# Patient Record
Sex: FEMALE | Race: WHITE | NOT HISPANIC OR LATINO | Employment: OTHER | URBAN - METROPOLITAN AREA
[De-identification: names, ages, dates, MRNs, and addresses within clinical notes are randomized per-mention and may not be internally consistent; named-entity substitution may affect disease eponyms.]

---

## 2017-01-23 RX ORDER — LEVOTHYROXINE SODIUM 25 UG/1
TABLET ORAL
COMMUNITY
Start: 2015-08-14 | End: 2017-08-08

## 2017-01-23 RX ORDER — ALPRAZOLAM 0.25 MG/1
0.25 TABLET ORAL
COMMUNITY

## 2017-01-23 RX ORDER — OMEPRAZOLE 40 MG/1
40 CAPSULE, DELAYED RELEASE ORAL DAILY
COMMUNITY
Start: 2016-12-08

## 2017-01-23 RX ORDER — METFORMIN HYDROCHLORIDE 500 MG/1
500 TABLET ORAL 2 TIMES DAILY WITH MEALS
COMMUNITY
Start: 2014-11-10

## 2017-01-23 RX ORDER — HYDROCHLOROTHIAZIDE 12.5 MG/1
12.5 TABLET ORAL
COMMUNITY
Start: 2014-12-04 | End: 2018-06-28

## 2017-01-23 RX ORDER — ASPIRIN 81 MG/1
81 TABLET ORAL DAILY
COMMUNITY

## 2017-01-23 RX ORDER — VIT C/E/ZN/COPPR/LUTEIN/ZEAXAN 250MG-90MG
1000 CAPSULE ORAL
COMMUNITY

## 2017-01-23 RX ORDER — PRAVASTATIN SODIUM 40 MG/1
40 TABLET ORAL
COMMUNITY
Start: 2014-04-21 | End: 2018-06-28

## 2017-01-23 RX ORDER — AZELASTINE 1 MG/ML
1 SPRAY, METERED NASAL
COMMUNITY
Start: 2016-12-08

## 2017-01-23 RX ORDER — METOPROLOL SUCCINATE 100 MG/1
100 TABLET, EXTENDED RELEASE ORAL
COMMUNITY
Start: 2014-12-04 | End: 2018-06-28

## 2017-01-24 ENCOUNTER — OFFICE VISIT (OUTPATIENT)
Dept: VASCULAR SURGERY | Facility: CLINIC | Age: 67
End: 2017-01-24
Payer: MEDICARE

## 2017-01-24 VITALS
BODY MASS INDEX: 25.06 KG/M2 | HEART RATE: 68 BPM | WEIGHT: 127.63 LBS | DIASTOLIC BLOOD PRESSURE: 75 MMHG | SYSTOLIC BLOOD PRESSURE: 150 MMHG | HEIGHT: 60 IN

## 2017-01-24 DIAGNOSIS — I65.23 BILATERAL CAROTID ARTERY STENOSIS: ICD-10-CM

## 2017-01-24 DIAGNOSIS — I10 ESSENTIAL HYPERTENSION: ICD-10-CM

## 2017-01-24 DIAGNOSIS — I25.10 CORONARY ARTERY DISEASE INVOLVING NATIVE CORONARY ARTERY OF NATIVE HEART WITHOUT ANGINA PECTORIS: ICD-10-CM

## 2017-01-24 DIAGNOSIS — Z72.0 TOBACCO ABUSE: ICD-10-CM

## 2017-01-24 DIAGNOSIS — E11.9 CONTROLLED TYPE 2 DIABETES MELLITUS WITHOUT COMPLICATION, WITHOUT LONG-TERM CURRENT USE OF INSULIN: ICD-10-CM

## 2017-01-24 PROCEDURE — 99406 BEHAV CHNG SMOKING 3-10 MIN: CPT | Mod: S$GLB,,, | Performed by: THORACIC SURGERY (CARDIOTHORACIC VASCULAR SURGERY)

## 2017-01-24 PROCEDURE — 1159F MED LIST DOCD IN RCRD: CPT | Mod: S$GLB,,, | Performed by: THORACIC SURGERY (CARDIOTHORACIC VASCULAR SURGERY)

## 2017-01-24 PROCEDURE — 3078F DIAST BP <80 MM HG: CPT | Mod: S$GLB,,, | Performed by: THORACIC SURGERY (CARDIOTHORACIC VASCULAR SURGERY)

## 2017-01-24 PROCEDURE — 99205 OFFICE O/P NEW HI 60 MIN: CPT | Mod: 25,S$GLB,, | Performed by: THORACIC SURGERY (CARDIOTHORACIC VASCULAR SURGERY)

## 2017-01-24 PROCEDURE — 3077F SYST BP >= 140 MM HG: CPT | Mod: S$GLB,,, | Performed by: THORACIC SURGERY (CARDIOTHORACIC VASCULAR SURGERY)

## 2017-01-24 PROCEDURE — 1160F RVW MEDS BY RX/DR IN RCRD: CPT | Mod: S$GLB,,, | Performed by: THORACIC SURGERY (CARDIOTHORACIC VASCULAR SURGERY)

## 2017-01-24 PROCEDURE — 3046F HEMOGLOBIN A1C LEVEL >9.0%: CPT | Mod: S$GLB,,, | Performed by: THORACIC SURGERY (CARDIOTHORACIC VASCULAR SURGERY)

## 2017-01-24 PROCEDURE — 2022F DILAT RTA XM EVC RTNOPTHY: CPT | Mod: S$GLB,,, | Performed by: THORACIC SURGERY (CARDIOTHORACIC VASCULAR SURGERY)

## 2017-01-24 PROCEDURE — 3060F POS MICROALBUMINURIA REV: CPT | Mod: S$GLB,,, | Performed by: THORACIC SURGERY (CARDIOTHORACIC VASCULAR SURGERY)

## 2017-01-24 PROCEDURE — 1126F AMNT PAIN NOTED NONE PRSNT: CPT | Mod: S$GLB,,, | Performed by: THORACIC SURGERY (CARDIOTHORACIC VASCULAR SURGERY)

## 2017-01-24 PROCEDURE — 99999 PR PBB SHADOW E&M-EST. PATIENT-LVL III: CPT | Mod: PBBFAC,,, | Performed by: THORACIC SURGERY (CARDIOTHORACIC VASCULAR SURGERY)

## 2017-01-24 PROCEDURE — 1157F ADVNC CARE PLAN IN RCRD: CPT | Mod: S$GLB,,, | Performed by: THORACIC SURGERY (CARDIOTHORACIC VASCULAR SURGERY)

## 2017-01-24 RX ORDER — CLOPIDOGREL BISULFATE 75 MG/1
75 TABLET ORAL DAILY
Qty: 30 TABLET | Refills: 11 | Status: SHIPPED | OUTPATIENT
Start: 2017-01-24 | End: 2019-03-19

## 2017-01-24 RX ORDER — SULFAMETHOXAZOLE AND TRIMETHOPRIM 800; 160 MG/1; MG/1
1 TABLET ORAL 2 TIMES DAILY
Refills: 0 | COMMUNITY
Start: 2017-01-06 | End: 2017-08-08

## 2017-01-24 NOTE — MR AVS SNAPSHOT
[facility]  [address] [facility] [address]  Phone: [phone]                  Liliana Calderon   2017 9:30 AM   Office Visit    Description:  Female : 1950   Provider:  Sheldon Carrillo MD   [facility]           Reason for Visit     Carotid Artery Disease           Diagnoses this Visit        Comments    Bilateral carotid artery stenosis         Tobacco abuse         Essential hypertension         Coronary artery disease involving native coronary artery of native heart without angina pectoris         Controlled type 2 diabetes mellitus without complication, without long-term current use of insulin                To Do List           Goals (5 Years of Data)     None      Follow-Up and Disposition     Return in about 6 months (around 2017).       These Medications        Disp Refills Start End    clopidogrel (PLAVIX) 75 mg tablet 30 tablet 11 2017    Take 1 tablet (75 mg total) by mouth once daily. - Oral    Pharmacy: [facility][id number] - [place], [place] - [address] #: [phone]         [facility]     [facility] Nurse Care Line -  Assistance  Registered nurses in the [facility] Center provide clinical advisement, health education, appointment booking, and other advisory services.  Call for this free service at [phone].             Medications           START taking these NEW medications        Refills    clopidogrel (PLAVIX) 75 mg tablet 11    Sig: Take 1 tablet (75 mg total) by mouth once daily.    Class: Normal    Route: Oral           Verify that the below list of medications is an accurate representation of the medications you are currently taking.  If none reported, the list may be blank. If incorrect, please contact your healthcare provider. Carry this list with you in case of emergency.           Current Medications     alprazolam (XANAX) 0.25 MG tablet Take 0.25 mg  "by mouth.    aspirin (ECOTRIN) 81 MG EC tablet Take 81 mg by mouth.    azelastine (ASTELIN) 137 mcg (0.1 %) nasal spray 1 spray by Nasal route.    cholecalciferol, vitamin D3, 1,000 unit capsule Take 1,000 Units by mouth.    clopidogrel (PLAVIX) 75 mg tablet Take 1 tablet (75 mg total) by mouth once daily.    hydrochlorothiazide (HYDRODIURIL) 12.5 MG Tab Take 12.5 mg by mouth.    levothyroxine (SYNTHROID) 25 MCG tablet daily.     metformin (GLUCOPHAGE) 500 MG tablet Take 500 mg by mouth.    metoprolol succinate (TOPROL-XL) 100 MG 24 hr tablet Take 100 mg by mouth.    omeprazole (PRILOSEC) 40 MG capsule Take 40 mg by mouth.    pravastatin (PRAVACHOL) 40 MG tablet Take 40 mg by mouth.    sulfamethoxazole-trimethoprim 800-160mg (BACTRIM DS) 800-160 mg Tab Take 1 tablet by mouth 2 (two) times daily.           Clinical Reference Information           Vital Signs - Last Recorded  Most recent update: 1/24/2017  9:57 AM by Arabella Betancourt MA    BP Pulse Ht Wt BMI    (!) 150/75 68 5' 0.05" (1.525 m) 57.9 kg (127 lb 10.3 oz) 24.89 kg/m2      Blood Pressure          Most Recent Value    BP  (!)  150/75      Allergies as of 1/24/2017     Aspirin    Cortisone    Iodine And Iodide Containing Products    Penicillins    Prednisone      Immunizations Administered on Date of Encounter - 1/24/2017     None      Orders Placed During Today's Visit     Future Labs/Procedures Expected by Expires    US Carotid Bilateral  6/26/2017 1/24/2018      [facility] Sign-Up     Activating your [facility] account is as easy as 1-2-3!     1) Visit [email or web address], select Sign Up Now, enter this activation code and your date of birth, then select Next.  [email or web address]  Expires: 3/10/2017  5:19 PM      2) Create a username and password to use when you visit [facility] in the future and select a security question in case you lose your password and select Next.    3) Enter your e-mail address and click Sign Up!    Additional Information  If you have " questions, please e-mail [email or web address] or call [phone] to talk to our [facility] staff. Remember, [facility] is NOT to be used for urgent needs. For medical emergencies, dial 911.         Smoking Cessation     If you would like to quit smoking:   You may be eligible for free services if you are a [place] resident and started smoking cigarettes before September 1, 1988.  Call the Smoking Cessation Trust (SCT) toll free at (833) 426-0489 or (079) 418-2997.   Call 9-303-QUIT-NOW if you do not meet the above criteria.

## 2017-01-24 NOTE — LETTER
January 24, 2017      Willis Phillips MD  [address] [facility] [address]           [facility]  [address] [facility] [address]  Phone: [phone]          Patient: Liliana Calderon   MR Number: [id number]   YOB: 1950   Date of Visit: 1/24/2017       Dear Dr. Willis Phillips:    Thank you for referring Liliana Calderon to me for evaluation. Attached you will find relevant portions of my assessment and plan of care.    If you have questions, please do not hesitate to call me. I look forward to following Liliana Calderon along with you.    Sincerely,    Sheldon Carrillo MD    Enclosure  CC:  No Recipients    If you would like to receive this communication electronically, please contact [email or web address] or [phone] to request more information on [facility] access.    For providers and/or their staff who would like to refer a patient to [facility], please contact us through our one-stop-shop provider referral line, [place], at [phone].    If you feel you have received this communication in error or would no longer like to receive these types of communications, please e-mail [email or web address]

## 2017-01-24 NOTE — PROGRESS NOTES
CLINIC NOTE    CHIEF COMPLAINT:  Abnormal carotid studies.    HISTORY  OF PRESENT ILLNESS:  The patient is a 66-year-old white female with no   history of stroke and no lateralizing neurologic complaints.  She was being   evaluated by her ophthalmologist for cataracts when some left retinal artery   plaques were noted suggestive of embolic disease to the eye.  She underwent a   carotid ultrasound which was abnormal, and she has undergone CT angiography as   well.  She has no amaurosis fugax and no lateralizing neurologic complaints.    She has had some episodes where she had visual sensation of a ring of water in   the left eye on a few occasions.    PAST MEDICAL HISTORY:  Coronary artery disease, diverticulitis, left skull   fracture, dyslipidemia, hypertension and gastroesophageal reflux disease.    PAST SURGICAL HISTORY:  Repair of left skull fracture, coronary stent placement   in , appendectomy, umbilical hernia repair, hysterectomy, thoracic outlet   surgery, and oophorectomy.    MEDICATIONS:  Currently include HCTZ, Synthroid, Glucophage, Toprol-,   Prilosec, Pravachol, Xanax, Ecotrin, cholecalciferol and Astelin nasal spray.    ALLERGIES:  Full dose aspirin causes stomach bleeding, but she can tolerate   low-dose aspirin.  Penicillin causes rash.  Prednisone causes nausea and   vomiting.    SOCIAL HISTORY:  Positive for significant tobacco/cigarette use, now down to   1-1/2 packs per day, but was as high as 3 packs per day.    FAMILY HISTORY:  Positive for heart disease in father who  at age 74;   dementia in the mother who  at age 88; diabetes; hypertension; and stroke.    REVIEW OF SYSTEMS:  GENERAL:  Positive fatigue.  No weight changes.  HEENT:  Sinus infection and left-sided headache near the area of her previous   skull fracture.  She has cataracts bilaterally and wears eyeglasses.  NECK:  No complaints.  RESPIRATORY:  Positive chronic intermittent cough.  GASTROINTESTINAL:  Positive  indigestion and abdominal pain.  GENITOURINARY:  No dysuria.  NEUROLOGIC:  No lateralizing complaints.    PHYSICAL EXAMINATION:  VITAL SIGNS:  Blood pressure 150/75, heart rate 68, weight is 57.9 kg.  GENERAL:  Pleasant, well-nourished, well-developed lady, in no obvious distress.  HEENT:  Normocephalic, atraumatic.  There is good facial symmetry.  NECK:  Trachea is midline.  There is a soft right carotid bruit.  CHEST:  No chest wall abnormalities.  LUNGS:  Clear bilaterally.  HEART:  Regular rate and rhythm.  No rub or murmur.  ABDOMEN:  Bowel sounds are normal.  EXTREMITIES:  No cyanosis, clubbing or edema.  NEUROLOGIC:  Alert and oriented x4 with no focal deficits.    STUDIES:  Carotid ultrasound from [facility] 12/16/2016   reveals on the right a peak systolic velocity of 330 and on the left the peak   systolic velocity of 185 in the internal carotid artery.  Peak systolic velocity   on the left is 1.6 and on the right is 3.6.  CT angiogram of the carotids from   [facility] on 01/03/2017, which I have reviewed personally, reveals nearly 50%   stenosis of a short segment of the proximal left internal carotid artery and 70%   short segment calcified stenosis of the right internal carotid artery.    IMPRESSION:  1.  Bilateral internal carotid artery stenoses, likely asymptomatic.  2.  Abnormal findings in the left eye at ophthalmologic examination suggestive   of possible embolic disease, but with minimal carotid disease.  3.  Hypertension.  4.  Type 2 diabetes without complication.  5.  Coronary artery disease without angina, previous myocardial infarction.  6.  Hyperlipidemia.  7. Chronic, ongoing cigarette use. Difficulty, but willing to try to quit.    RECOMMENDATIONS:  Given that the 70% right stenosis is asymptomatic, I would not   recommend surgical management for this.  The left carotid stenosis is at worst   50%, and not really symptomatic.  The left ophthalmologic findings were present    on an initial examination and had resolved on subsequent exam, therefore, I   cannot say with certainty that the left carotid was the culprit for this.  Given   these findings, I would recommend we advance our antiplatelet therapy and add   Plavix daily.  She should follow me in six months with a repeat carotid   ultrasound.  Should any lateralizing neurologic complaints or amaurosis fugax   arise in the interim, then she should present for further evaluation.  Until I   see her, she is certainly cleared for cataract removal should it be desired.   I had a 3-8 minute discussion with both her and her  about the negative effects of smoking on vascular disease as well as on lung disease. They had tried some counseling, but it was not necessarily delivered in an effective fashion. Will help arrange tobacco cessation through [facility].      ASIA/SHILPI  dd: 01/24/2017 17:16:24 (CST)  td: 01/24/2017 19:29:30 (CST)  Doc ID   #[id number]  Job ID #[id number]    CC: AFRICA GEORGES M.D.

## 2017-07-25 ENCOUNTER — TELEPHONE (OUTPATIENT)
Dept: RADIOLOGY | Facility: HOSPITAL | Age: 67
End: 2017-07-25

## 2017-07-26 ENCOUNTER — HOSPITAL ENCOUNTER (OUTPATIENT)
Dept: RADIOLOGY | Facility: HOSPITAL | Age: 67
Discharge: HOME | End: 2017-07-26
Attending: THORACIC SURGERY (CARDIOTHORACIC VASCULAR SURGERY)
Payer: MEDICARE

## 2017-07-26 DIAGNOSIS — I65.23 BILATERAL CAROTID ARTERY STENOSIS: ICD-10-CM

## 2017-07-26 DIAGNOSIS — I25.10 CORONARY ARTERY DISEASE INVOLVING NATIVE CORONARY ARTERY OF NATIVE HEART WITHOUT ANGINA PECTORIS: ICD-10-CM

## 2017-07-26 DIAGNOSIS — Z72.0 TOBACCO ABUSE: ICD-10-CM

## 2017-07-26 DIAGNOSIS — E11.9 CONTROLLED TYPE 2 DIABETES MELLITUS WITHOUT COMPLICATION, WITHOUT LONG-TERM CURRENT USE OF INSULIN: ICD-10-CM

## 2017-07-26 PROCEDURE — 93880 EXTRACRANIAL BILAT STUDY: CPT | Mod: TC,PO

## 2017-07-26 PROCEDURE — 93880 EXTRACRANIAL BILAT STUDY: CPT | Mod: 26,,, | Performed by: RADIOLOGY

## 2017-08-08 ENCOUNTER — OFFICE VISIT (OUTPATIENT)
Dept: VASCULAR SURGERY | Facility: CLINIC | Age: 67
End: 2017-08-08
Payer: MEDICARE

## 2017-08-08 VITALS
BODY MASS INDEX: 23.19 KG/M2 | HEART RATE: 72 BPM | SYSTOLIC BLOOD PRESSURE: 180 MMHG | DIASTOLIC BLOOD PRESSURE: 74 MMHG | HEIGHT: 61 IN | WEIGHT: 122.81 LBS

## 2017-08-08 DIAGNOSIS — Z72.0 TOBACCO ABUSE: ICD-10-CM

## 2017-08-08 DIAGNOSIS — I65.23 BILATERAL CAROTID ARTERY STENOSIS: Primary | ICD-10-CM

## 2017-08-08 PROCEDURE — 99213 OFFICE O/P EST LOW 20 MIN: CPT | Mod: S$GLB,,, | Performed by: THORACIC SURGERY (CARDIOTHORACIC VASCULAR SURGERY)

## 2017-08-08 PROCEDURE — 1159F MED LIST DOCD IN RCRD: CPT | Mod: S$GLB,,, | Performed by: THORACIC SURGERY (CARDIOTHORACIC VASCULAR SURGERY)

## 2017-08-08 PROCEDURE — 1125F AMNT PAIN NOTED PAIN PRSNT: CPT | Mod: S$GLB,,, | Performed by: THORACIC SURGERY (CARDIOTHORACIC VASCULAR SURGERY)

## 2017-08-08 PROCEDURE — 3078F DIAST BP <80 MM HG: CPT | Mod: S$GLB,,, | Performed by: THORACIC SURGERY (CARDIOTHORACIC VASCULAR SURGERY)

## 2017-08-08 PROCEDURE — 3077F SYST BP >= 140 MM HG: CPT | Mod: S$GLB,,, | Performed by: THORACIC SURGERY (CARDIOTHORACIC VASCULAR SURGERY)

## 2017-08-08 PROCEDURE — 3008F BODY MASS INDEX DOCD: CPT | Mod: S$GLB,,, | Performed by: THORACIC SURGERY (CARDIOTHORACIC VASCULAR SURGERY)

## 2017-08-08 PROCEDURE — 99999 PR PBB SHADOW E&M-EST. PATIENT-LVL III: CPT | Mod: PBBFAC,,, | Performed by: THORACIC SURGERY (CARDIOTHORACIC VASCULAR SURGERY)

## 2017-08-08 RX ORDER — LEVOTHYROXINE SODIUM 50 UG/1
TABLET ORAL
COMMUNITY
Start: 2017-07-09 | End: 2018-06-28

## 2017-08-08 NOTE — PROGRESS NOTES
CLINIC NOTE    Ms. Calderon is a 66-year-old white female with the history of asymptomatic bilateral   internal carotid artery stenosis.  She has no history of stroke.  She did have   an abnormal carotid ultrasound.  She denies amaurosis fugax or new lateralizing   neurologic complaints.  She did trip and fall causing facial fractures and other   bony fractures for which she is still undergoing treatment.    MEDICATIONS:  Reviewed and include Plavix, aspirin and Pravachol.    ALLERGIES:  Reviewed.    REVIEW OF SYSTEMS:  Negative for amaurosis fugax and negative for lateralizing   neurologic complaints.  She was not having a neurologic problem that caused her   fall.    PHYSICAL EXAMINATION:  VITAL SIGNS:  Blood pressure 180/74, heart rate 72, weight 55.7 kg.  GENERAL:  A slender, pleasant lady in no obvious distress.  HEENT:  Normocephalic and atraumatic.  She is wearing eyeglasses.  Pupils are   reactive.  Extraocular movements are intact.  NECK:  Trachea is midline.  There is a soft right carotid bruit.  CHEST:  Lungs are clear.  HEART:  Regular rate and rhythm.  NEUROLOGIC:  Alert and oriented x4.  There are no focal deficits.  There is good   motor tone bilaterally.    STUDIES:  Carotid ultrasound at [facility] on 07/26/2017 is compared to her [place] study.  Her peak systolic velocity on the right is 366 with a ratio of 3.0.    Peak systolic velocity on the left is 271 with a ratio of 1.7.  On her   ultrasound from December 2016, the ratios were 3.6 and 1.6 with velocities of   330 and 185.  Overall, her ultrasound is similar to that of six months ago   without significant change.  She had had a CT angiogram on 01/03/2017 revealing   50% left internal carotid artery stenosis and 70% right internal carotid artery   stenosis.    IMPRESSION:  Bilateral internal carotid artery stenosis without symptoms.    PLAN:  Again, I do not believe surgical intervention is indicated for her 70%   and 50% stenoses that are  asymptomatic.  Since her velocities are quite high, I   would like to have her follow with me in six months with another ultrasound.  If   this one remains stable, we will change to yearly followup.  If the indices   worsen, further consideration for surgical intervention can be made.      ASIA/BRIAN  dd: 08/08/2017 10:43:23 (CDT)  td: 08/08/2017 15:05:28 (CDT)  Doc ID   #[id number]  Job ID #[id number]    CC:

## 2017-11-27 ENCOUNTER — TELEPHONE (OUTPATIENT)
Dept: VASCULAR SURGERY | Facility: CLINIC | Age: 67
End: 2017-11-27

## 2018-03-23 ENCOUNTER — HOSPITAL ENCOUNTER (OUTPATIENT)
Dept: RADIOLOGY | Facility: HOSPITAL | Age: 68
Discharge: HOME | End: 2018-03-23
Attending: THORACIC SURGERY (CARDIOTHORACIC VASCULAR SURGERY)
Payer: MEDICARE

## 2018-03-23 DIAGNOSIS — I65.23 BILATERAL CAROTID ARTERY STENOSIS: ICD-10-CM

## 2018-03-23 PROCEDURE — 93880 EXTRACRANIAL BILAT STUDY: CPT | Mod: 26,,, | Performed by: RADIOLOGY

## 2018-03-23 PROCEDURE — 93880 EXTRACRANIAL BILAT STUDY: CPT | Mod: TC,PO

## 2018-04-05 ENCOUNTER — TELEPHONE (OUTPATIENT)
Dept: VASCULAR SURGERY | Facility: CLINIC | Age: 68
End: 2018-04-05

## 2018-04-05 NOTE — TELEPHONE ENCOUNTER
----- Message from Kaylene Peña sent at 4/5/2018  2:37 PM CDT -----  Contact: self  Patient is calling for ultrasound of carotid ar qian results done on 03/23/18 . Please call patient at [phone]. Thanks!

## 2018-05-01 ENCOUNTER — TELEPHONE (OUTPATIENT)
Dept: VASCULAR SURGERY | Facility: CLINIC | Age: 68
End: 2018-05-01

## 2018-05-01 ENCOUNTER — OFFICE VISIT (OUTPATIENT)
Dept: VASCULAR SURGERY | Facility: CLINIC | Age: 68
End: 2018-05-01
Payer: MEDICARE

## 2018-05-01 VITALS
DIASTOLIC BLOOD PRESSURE: 76 MMHG | WEIGHT: 116.81 LBS | HEART RATE: 81 BPM | SYSTOLIC BLOOD PRESSURE: 114 MMHG | BODY MASS INDEX: 22.05 KG/M2 | HEIGHT: 61 IN

## 2018-05-01 DIAGNOSIS — I10 ESSENTIAL HYPERTENSION: ICD-10-CM

## 2018-05-01 DIAGNOSIS — I65.23 BILATERAL CAROTID ARTERY STENOSIS: Primary | ICD-10-CM

## 2018-05-01 PROCEDURE — 3074F SYST BP LT 130 MM HG: CPT | Mod: CPTII,S$GLB,, | Performed by: THORACIC SURGERY (CARDIOTHORACIC VASCULAR SURGERY)

## 2018-05-01 PROCEDURE — 3078F DIAST BP <80 MM HG: CPT | Mod: CPTII,S$GLB,, | Performed by: THORACIC SURGERY (CARDIOTHORACIC VASCULAR SURGERY)

## 2018-05-01 PROCEDURE — 99999 PR PBB SHADOW E&M-EST. PATIENT-LVL III: CPT | Mod: PBBFAC,,, | Performed by: THORACIC SURGERY (CARDIOTHORACIC VASCULAR SURGERY)

## 2018-05-01 PROCEDURE — 99214 OFFICE O/P EST MOD 30 MIN: CPT | Mod: S$GLB,,, | Performed by: THORACIC SURGERY (CARDIOTHORACIC VASCULAR SURGERY)

## 2018-05-01 RX ORDER — NIACIN 250 MG
250 TABLET ORAL
COMMUNITY
End: 2018-06-28

## 2018-05-01 NOTE — TELEPHONE ENCOUNTER
----- Message from Jennifer Olson sent at 5/1/2018  4:43 PM CDT -----  Contact: Pt.   Pt. States that she would like to go forward with the surgery this Thursday at [place]. Pt. would like to be schedule. [phone] (Cell)

## 2018-05-01 NOTE — PROGRESS NOTES
CLINIC NOTE     CHIEF COMPLAINT: Carotid artery stenosis    HISTORY OF PRESENT ILLNESS:  Ms. Calderon is a 67-year-old white female with the history of asymptomatic bilateral   internal carotid artery stenosis.  She has no history of stroke.  She did have   an abnormal carotid ultrasound.  She denies amaurosis fugax or new lateralizing   neurologic complaints.  She did trip and fall causing facial fractures and other   bony fractures for which she is still undergoing treatment.  Some complaints from this include TMJ.  She is developed pains that radiate from her jaw down to her arm.  She was concerned that this was cardiac in nature.  She has had a normal stress test.     MEDICATIONS:  Reviewed and include Plavix, aspirin and Pravachol.     ALLERGIES:   bleach and perfume, shortness of breath, aspirin, cortisone, iodine, penicillin, prednisone (nausea and vomiting)     REVIEW OF SYSTEMS:   Gen.: No fevers, chills, night sweats.  HEENT: Pain in jaw that is worsened in cold an inch stress.  Pain radiates to her left arm.  Neck: No complaints.  Respiratory: No shortness of breath.  Cardiac: No angina.  She has recently had a normal stress test.  GI: No constipation or melena:  Vascular: No claudication.  Neurologic:  Negative for amaurosis fugax and negative for lateralizing   neurologic complaints.  She was not having a neurologic problem that caused her   fall.     PHYSICAL EXAMINATION:  VITAL SIGNS:  Blood pressure 114/76, heart rate 81, weight 53 kg.  Height 5 feet 1/2 inch.  GENERAL:  A slender, pleasant lady in no obvious distress.  HEENT:  Normocephalic and atraumatic.  She is wearing eyeglasses.  Pupils are reactive.  Extraocular movements are intact.  NECK:  Trachea is midline.  There is a soft right carotid bruit.  There is no left carotid bruit.  CHEST:  Lungs are clear.  HEART:  Regular rate and rhythm.  SKIN: No rash.  VASCULAR: 2+ radial and carotid pulses bilaterally.  NEUROLOGIC:  Alert and oriented x4.   There are no focal deficits.  There is good   motor tone bilaterally.     STUDIES:    Carotid ultrasound: 3/23/2018.  Findings of worsened on the right.  The peak systolic velocity is now 390 with a ratio 5.6.  There is been slight worsening on the left with peak systolic velocity of 237 and ratio of 2.4      Carotid ultrasound at [facility] on 07/26/2017 is compared to her [place] study.  Her peak systolic velocity on the right is 366 with a ratio of 3.0.    Peak systolic velocity on the left is 271 with a ratio of 1.7.  On her   ultrasound from December 2016, the ratios were 3.6 and 1.6 with velocities of   330 and 185.  Overall, her ultrasound is similar to that of six months ago   without significant change.  She had had a CT angiogram on 01/03/2017 revealing   50% left internal carotid artery stenosis and 70% right internal carotid artery   stenosis.     IMPRESSION:    1.  Bilateral internal carotid artery stenosis without symptoms.  2.  The right internal carotid artery stenosis has significantly progressed with stenosis now likely at least 75 if not 85%.     PLAN:  I recommend the patient undergo right carotid endarterectomy.  I've discussed the benefits as well as risk of surgery including nerve injury, stroke, bleeding, infection, and death.  She is informed consent to proceed.  The left carotid stenosis will be followed in 6 months with ultrasound.

## 2018-05-03 ENCOUNTER — OUTSIDE PLACE OF SERVICE (OUTPATIENT)
Dept: ADMINISTRATIVE | Facility: OTHER | Age: 68
End: 2018-05-03
Payer: MEDICARE

## 2018-05-03 PROCEDURE — 35301 RECHANNELING OF ARTERY: CPT | Mod: RT,,, | Performed by: THORACIC SURGERY (CARDIOTHORACIC VASCULAR SURGERY)

## 2018-05-03 PROCEDURE — 35301 RECHANNELING OF ARTERY: CPT | Mod: AS,RT,, | Performed by: NURSE PRACTITIONER

## 2018-05-06 ENCOUNTER — OUTSIDE PLACE OF SERVICE (OUTPATIENT)
Dept: ADMINISTRATIVE | Facility: OTHER | Age: 68
End: 2018-05-06

## 2018-05-06 ENCOUNTER — OUTSIDE PLACE OF SERVICE (OUTPATIENT)
Dept: ADMINISTRATIVE | Facility: OTHER | Age: 68
End: 2018-05-06
Payer: MEDICARE

## 2018-05-06 PROCEDURE — 33508 ENDOSCOPIC VEIN HARVEST: CPT | Mod: ,,, | Performed by: THORACIC SURGERY (CARDIOTHORACIC VASCULAR SURGERY)

## 2018-05-06 PROCEDURE — 33510 CABG VEIN SINGLE: CPT | Mod: 22,,, | Performed by: THORACIC SURGERY (CARDIOTHORACIC VASCULAR SURGERY)

## 2018-05-06 PROCEDURE — 33970 AORTIC CIRCULATION ASSIST: CPT | Mod: 51,,, | Performed by: THORACIC SURGERY (CARDIOTHORACIC VASCULAR SURGERY)

## 2018-05-06 PROCEDURE — 99232 SBSQ HOSP IP/OBS MODERATE 35: CPT | Mod: 57,,, | Performed by: THORACIC SURGERY (CARDIOTHORACIC VASCULAR SURGERY)

## 2018-05-09 ENCOUNTER — OUTSIDE PLACE OF SERVICE (OUTPATIENT)
Dept: ADMINISTRATIVE | Facility: OTHER | Age: 68
End: 2018-05-09
Payer: MEDICARE

## 2018-05-09 PROCEDURE — 33971 AORTIC CIRCULATION ASSIST: CPT | Mod: 58,,, | Performed by: THORACIC SURGERY (CARDIOTHORACIC VASCULAR SURGERY)

## 2018-06-28 ENCOUNTER — OFFICE VISIT (OUTPATIENT)
Dept: VASCULAR SURGERY | Facility: CLINIC | Age: 68
End: 2018-06-28
Payer: MEDICARE

## 2018-06-28 VITALS
HEART RATE: 85 BPM | SYSTOLIC BLOOD PRESSURE: 116 MMHG | HEIGHT: 61 IN | DIASTOLIC BLOOD PRESSURE: 73 MMHG | BODY MASS INDEX: 20.96 KG/M2 | WEIGHT: 111 LBS

## 2018-06-28 DIAGNOSIS — Z95.1 S/P CABG X 2: ICD-10-CM

## 2018-06-28 PROCEDURE — 99024 POSTOP FOLLOW-UP VISIT: CPT | Mod: S$GLB,,, | Performed by: THORACIC SURGERY (CARDIOTHORACIC VASCULAR SURGERY)

## 2018-06-28 PROCEDURE — 99999 PR PBB SHADOW E&M-EST. PATIENT-LVL III: CPT | Mod: PBBFAC,,, | Performed by: THORACIC SURGERY (CARDIOTHORACIC VASCULAR SURGERY)

## 2018-06-28 RX ORDER — ROSUVASTATIN CALCIUM 20 MG/1
20 TABLET, COATED ORAL NIGHTLY
COMMUNITY
Start: 2018-06-18 | End: 2024-02-08

## 2018-06-28 RX ORDER — NITROGLYCERIN 0.4 MG/1
0.4 TABLET SUBLINGUAL
COMMUNITY
Start: 2018-06-18

## 2018-06-28 RX ORDER — PREGABALIN 50 MG/1
50 CAPSULE ORAL NIGHTLY
Refills: 5 | COMMUNITY
Start: 2018-06-21

## 2018-06-28 RX ORDER — POTASSIUM CHLORIDE 20 MEQ/1
20 TABLET, EXTENDED RELEASE ORAL
COMMUNITY
Start: 2018-06-18 | End: 2018-07-18

## 2018-06-28 RX ORDER — IBUPROFEN 200 MG
1 TABLET ORAL
COMMUNITY
Start: 2018-06-19 | End: 2018-07-10

## 2018-06-28 RX ORDER — METOPROLOL SUCCINATE 50 MG/1
25 TABLET, EXTENDED RELEASE ORAL DAILY
COMMUNITY
Start: 2018-06-19 | End: 2024-02-08

## 2018-06-28 RX ORDER — HYDROCODONE BITARTRATE AND ACETAMINOPHEN 7.5; 325 MG/1; MG/1
1 TABLET ORAL EVERY 8 HOURS PRN
Refills: 0 | COMMUNITY
Start: 2018-06-25

## 2018-06-28 RX ORDER — LANOLIN ALCOHOL/MO/W.PET/CERES
400 CREAM (GRAM) TOPICAL
COMMUNITY
Start: 2018-06-19 | End: 2018-07-19

## 2018-06-28 RX ORDER — RAMIPRIL 1.25 MG/1
1.25 CAPSULE ORAL DAILY
COMMUNITY
Start: 2018-06-19 | End: 2024-02-08

## 2018-06-28 NOTE — PROGRESS NOTES
OFFICE NOTE    HISTORY OF PRESENT ILLNESS:  This is a 67-year-old lady with significant heart   disease, status post coronary artery bypass grafting.  She comes to the office   today in followup.  She had a prolonged complicated postoperative course.  She   developed ischemia to the left leg and foot secondary to a balloon pump and she   has neuropraxia of the extremity as well as slow healing wounds on the toes.    She has a wound in the groins on both sides where the saphenous vein was   harvested as well.  Medicines are noted.  They are part of the recent EPIC   record.  Her problem list is reviewed.    PHYSICAL EXAMINATION:  Her chest incision is clean and dry.  There is no   evidence of infection where the saphenous vein was harvested from the thighs on   both sides.  She has some slow granulation on the left without evidence of   infection.  On the right side, she has a deeper penetrating wound that is fairly   clean with a decent granulation.  This was changed today.  The left foot has   open wounds on the toes where some of the soft tissue has sloughed off, but   enlarged.  Perfusion seems to be satisfactory and the healing process is   proceeding satisfactorily.    PLAN:  At this point, she should continue rehabilitation and wound care.  She   can see us on an as needed basis.  I have given a prescription for Percocet   because the Norco has not worked, and she has significant left leg and foot   pain.  I told to use these sparingly when the pain is severe.  Otherwise, she   can see us on an as needed basis.      VINOD  dd: 06/28/2018 10:04:41 (CDT)  td: 06/29/2018 02:43:00 (CDT)  Doc ID   #[id number]  Job ID #[id number]    CC:

## 2018-06-29 ENCOUNTER — TELEPHONE (OUTPATIENT)
Dept: VASCULAR SURGERY | Facility: CLINIC | Age: 68
End: 2018-06-29

## 2018-06-29 NOTE — TELEPHONE ENCOUNTER
Pt calling c/o nerve pain in lower leg not responding to pain med. Instructed to increase Lyrica dose to 75 mg twice a day per Dr. Carrizales. To call if no improvement.

## 2018-06-29 NOTE — TELEPHONE ENCOUNTER
----- Message from Blaine Nair sent at 6/29/2018  2:40 PM CDT -----  Contact: self   Patient want to speak with a nurse regarding leg pain need some medical advice please call back at [phone] (home) [phone] (work)

## 2018-10-26 DIAGNOSIS — I65.23 BILATERAL CAROTID ARTERY STENOSIS: Primary | ICD-10-CM

## 2019-01-23 ENCOUNTER — HOSPITAL ENCOUNTER (OUTPATIENT)
Dept: RADIOLOGY | Facility: HOSPITAL | Age: 69
Discharge: HOME | End: 2019-01-23
Attending: THORACIC SURGERY (CARDIOTHORACIC VASCULAR SURGERY)
Payer: MEDICARE

## 2019-01-23 DIAGNOSIS — I65.23 BILATERAL CAROTID ARTERY STENOSIS: ICD-10-CM

## 2019-01-23 PROCEDURE — 93880 EXTRACRANIAL BILAT STUDY: CPT | Mod: 26,,, | Performed by: RADIOLOGY

## 2019-01-23 PROCEDURE — 93880 EXTRACRANIAL BILAT STUDY: CPT | Mod: TC,PO

## 2019-01-23 PROCEDURE — 93880 US CAROTID BILATERAL: ICD-10-PCS | Mod: 26,,, | Performed by: RADIOLOGY

## 2019-02-04 ENCOUNTER — TELEPHONE (OUTPATIENT)
Dept: VASCULAR SURGERY | Facility: CLINIC | Age: 69
End: 2019-02-04

## 2019-03-19 ENCOUNTER — OFFICE VISIT (OUTPATIENT)
Dept: VASCULAR SURGERY | Facility: CLINIC | Age: 69
End: 2019-03-19
Payer: MEDICARE

## 2019-03-19 VITALS
SYSTOLIC BLOOD PRESSURE: 138 MMHG | HEIGHT: 60 IN | WEIGHT: 120 LBS | HEART RATE: 61 BPM | RESPIRATION RATE: 18 BRPM | BODY MASS INDEX: 23.56 KG/M2 | DIASTOLIC BLOOD PRESSURE: 62 MMHG

## 2019-03-19 DIAGNOSIS — I65.23 BILATERAL CAROTID ARTERY STENOSIS: Primary | ICD-10-CM

## 2019-03-19 PROCEDURE — 3075F PR MOST RECENT SYSTOLIC BLOOD PRESS GE 130-139MM HG: ICD-10-PCS | Mod: CPTII,S$GLB,, | Performed by: THORACIC SURGERY (CARDIOTHORACIC VASCULAR SURGERY)

## 2019-03-19 PROCEDURE — 3078F PR MOST RECENT DIASTOLIC BLOOD PRESSURE < 80 MM HG: ICD-10-PCS | Mod: CPTII,S$GLB,, | Performed by: THORACIC SURGERY (CARDIOTHORACIC VASCULAR SURGERY)

## 2019-03-19 PROCEDURE — 3075F SYST BP GE 130 - 139MM HG: CPT | Mod: CPTII,S$GLB,, | Performed by: THORACIC SURGERY (CARDIOTHORACIC VASCULAR SURGERY)

## 2019-03-19 PROCEDURE — 3078F DIAST BP <80 MM HG: CPT | Mod: CPTII,S$GLB,, | Performed by: THORACIC SURGERY (CARDIOTHORACIC VASCULAR SURGERY)

## 2019-03-19 PROCEDURE — 99999 PR PBB SHADOW E&M-EST. PATIENT-LVL III: CPT | Mod: PBBFAC,,, | Performed by: THORACIC SURGERY (CARDIOTHORACIC VASCULAR SURGERY)

## 2019-03-19 PROCEDURE — 1101F PT FALLS ASSESS-DOCD LE1/YR: CPT | Mod: CPTII,S$GLB,, | Performed by: THORACIC SURGERY (CARDIOTHORACIC VASCULAR SURGERY)

## 2019-03-19 PROCEDURE — 99214 PR OFFICE/OUTPT VISIT, EST, LEVL IV, 30-39 MIN: ICD-10-PCS | Mod: S$GLB,,, | Performed by: THORACIC SURGERY (CARDIOTHORACIC VASCULAR SURGERY)

## 2019-03-19 PROCEDURE — 99999 PR PBB SHADOW E&M-EST. PATIENT-LVL III: ICD-10-PCS | Mod: PBBFAC,,, | Performed by: THORACIC SURGERY (CARDIOTHORACIC VASCULAR SURGERY)

## 2019-03-19 PROCEDURE — 99214 OFFICE O/P EST MOD 30 MIN: CPT | Mod: S$GLB,,, | Performed by: THORACIC SURGERY (CARDIOTHORACIC VASCULAR SURGERY)

## 2019-03-19 PROCEDURE — 1101F PR PT FALLS ASSESS DOC 0-1 FALLS W/OUT INJ PAST YR: ICD-10-PCS | Mod: CPTII,S$GLB,, | Performed by: THORACIC SURGERY (CARDIOTHORACIC VASCULAR SURGERY)

## 2019-03-19 NOTE — PROGRESS NOTES
CHIEF COMPLAINT:   Chief Complaint   Patient presents with    Carotid Artery Disease         Subjective:  History of present illness     Patient is a 68 y.o. female with a history of right carotid endarterectomy on 05/03/2018.  She subsequently had myocardial infarction after being home and return for emergency coronary artery bypass surgery performed by my partner.  Sequela from her emergency surgery that required preoperative intra-aortic balloon pump placement was of weakness in the left leg secondary to some type of nerve damage to the leg. She has had numbness of the lower left leg which was above the knee and now has improved to some starting below the knee.  She has no other lateralizing complaints.  She has no amaurosis fugax.    Patient Active Problem List    Diagnosis Date Noted    S/P CABG x 2 06/28/2018    Bilateral carotid artery stenosis 01/24/2017    Tobacco abuse 01/24/2017    Essential hypertension 01/24/2017    Coronary artery disease involving native coronary artery of native heart without angina pectoris 01/24/2017    Controlled type 2 diabetes mellitus without complication 01/24/2017     Past Medical History:   Diagnosis Date    Bilateral carotid artery stenosis 1/24/2017    Controlled type 2 diabetes mellitus without complication 1/24/2017    Coronary artery disease involving native coronary artery of native heart without angina pectoris 1/24/2017    Essential hypertension 1/24/2017    Hypothyroidism     Myocardial infarct 2006    Peptic ulcer disease     Thoracic outlet syndrome     Left. Resolved with rib resection    Tobacco abuse 1/24/2017      Past Surgical History:   Procedure Laterality Date    APPENDECTOMY      CARDIAC CATHETERIZATION      CHOLECYSTECTOMY      CORONARY ANGIOPLASTY      CORONARY ARTERY BYPASS GRAFT  05/05/2018    HERNIA REPAIR      HYSTERECTOMY      THORACIC OUTLET SURGERY Left       Medication List with Changes/Refills   Current Medications     "ALPRAZOLAM (XANAX) 0.25 MG TABLET    Take 0.25 mg by mouth.    ASPIRIN (ECOTRIN) 81 MG EC TABLET    Take 81 mg by mouth.    AZELASTINE (ASTELIN) 137 MCG (0.1 %) NASAL SPRAY    1 spray by Nasal route.    CHOLECALCIFEROL, VITAMIN D3, 1,000 UNIT CAPSULE    Take 1,000 Units by mouth.    CLOPIDOGREL (PLAVIX) 75 MG TABLET    Take 1 tablet (75 mg total) by mouth once daily.    HYDROCODONE-ACETAMINOPHEN (NORCO) 7.5-325 MG PER TABLET    Take 1 tablet by mouth every 8 (eight) hours as needed.    LYRICA 50 MG CAPSULE    Take 50 mg by mouth 2 (two) times daily.    METFORMIN (GLUCOPHAGE) 500 MG TABLET    Take 500 mg by mouth.    METOPROLOL SUCCINATE (TOPROL-XL) 50 MG 24 HR TABLET    Take 50 mg by mouth.    NITROGLYCERIN (NITROSTAT) 0.4 MG SL TABLET    Place 0.4 mg under the tongue.    OMEPRAZOLE (PRILOSEC) 40 MG CAPSULE    Take 40 mg by mouth.    RAMIPRIL (ALTACE) 1.25 MG CAPSULE    Take 1.25 mg by mouth.    ROSUVASTATIN (CRESTOR) 20 MG TABLET    Take 20 mg by mouth.     Review of patient's allergies indicates:   Allergen Reactions    Bleach (sodium hypochlorite) Shortness Of Breath     "Allergic to Cleaning Products"    Perfume Other (See Comments) and Shortness Of Breath     Eye drainage.    Aspirin      Full strength ASA makes her vomit blood    Cortisone Nausea And Vomiting    Iodine and iodide containing products     Penicillins     Prednisone Nausea And Vomiting      Social History     Tobacco Use    Smoking status: Former Smoker     Packs/day: 1.00     Types: Cigarettes     Last attempt to quit: 2018     Years since quittin.8    Smokeless tobacco: Never Used   Substance Use Topics    Alcohol use: No      History reviewed. No pertinent family history.     Review of Systems   General:  No fevers, chills, night sweats.  HEENT:  She wears eyeglasses.  Neck:  No complaints.  Respiratory:  No shortness of breath or hemoptysis.  Cardiac:  No angina, orthopnea, PND.  GI:  No constipation or melena.  Neurologic:  " On there is weakness in the left leg including footdrop.  She has had some muscle atrophy in the left leg as well. She notices her left leg is cool most of the time.  Numbness has improved in the lower leg.  It previously was above the knee now it is slightly below the knee at its start.  Vascular:  No claudication.  Her legs actually feel better as she is moving them.    Objective:  Physical exam     Vitals:    03/19/19 1115   BP: 138/62   Pulse: 61   Resp: 18   Weight: 54.4 kg (120 lb)   Height: 5' (1.524 m)   PainSc:   6   PainLoc: Leg     General:  Pleasant lady in no obvious distress.  HEENT:  She is wearing eyeglasses.  She has good facial symmetry.  Neck:  Trachea is midline.  There are no carotid bruits.  Chest:  Lungs are clear bilaterally.  Heart:  Regular rate and rhythm.  Extremities:  There is some muscle atrophy below the knee in the left leg. There is slight left foot drop.  Vascular:  Femoral pulses are palpable bilaterally.  Neurologic:  Weakness and numbness in the lower left leg as described.    Data Review:   No results found for: WBC, HGB, HCT, MCV, PLT,   BMP No results found for: NA, K, CL, CO2, BUN, CREATININE, CALCIUM, ANIONGAP, ESTGFRAFRICA, EGFRNONAA,   CMP  No results found for: NA, K, CL, CO2, GLU, BUN, CREATININE, CALCIUM, PROT, ALBUMIN, BILITOT, ALKPHOS, AST, ALT, ANIONGAP, ESTGFRAFRICA, EGFRNONAA,   No results found for: INR, PROTIME    Carotid ultrasound:  01/24/2019.  CLINICAL HISTORY:  Occlusion and stenosis of bilateral carotid arteries    TECHNIQUE:  Grayscale and color Doppler ultrasound examination of the carotid and vertebral artery systems bilaterally.  Stenosis estimates are per the NASCET measurement criteria.    COMPARISON:  March 23, 2018    FINDINGS:  Right:    Status post right CEA    Internal Carotid Artery (ICA):    Peak systolic velocity 131 cm/sec    End diastolic velocity 33 cm/sec    ICA/CCA peak systolic ratio: 1.8    ICA/CCA end diastolic ratio: 2.6    Plaque  formation: No significant status post CEA.    Vertebral artery: Antegrade flow and normal waveform.    Left:    Internal Carotid Artery (ICA):    Peak systolic velocity 275 cm/sec    End diastolic velocity 75 cm/sec    ICA/CCA peak systolic ratio: 2.7    ICA/CCA end diastolic ratio: 3.4    Plaque formation: Extensive plaque again noted within the bulb and left proximal ICA.    Vertebral artery: Antegrade flow and normal waveform.      Impression       Status post right CEA.  No hemodynamically significant plaque formation or stenosis involving the right ICA.    Extensive plaque and elevated velocities and ratios again noted within the left ICA with estimated stenosis in the greater than 70% stenosis range.  No significant change from previous exam.      Electronically signed by: Michael Ramachandran MD  Date: 01/24/2019  Time: 09:48         Assessment:     1.  Progressive, asymptomatic left internal carotid artery stenosis.  2.  History of previous right carotid endarterectomy without significant recurrent disease.  3.  Neurologic weakness and numbness left leg which is improving.  4.  History coronary artery bypass surgery.    Plan:     Given the progressive and significant nature of the asymptomatic left carotid stenosis, I would recommend patient at least to have imaging to more definitively describe her disease.  Likely she will also be recommended for left carotid endarterectomy.  CT angiogram of the carotid arteries will be performed.  She will follow up after that study.

## 2019-04-18 ENCOUNTER — TELEPHONE (OUTPATIENT)
Dept: VASCULAR SURGERY | Facility: CLINIC | Age: 69
End: 2019-04-18

## 2019-04-18 NOTE — TELEPHONE ENCOUNTER
----- Message from Blaine Nair sent at 4/18/2019  1:39 PM CDT -----  Contact: self   Patient want to speak with a nurse regarding test results please call back at [phone]

## 2019-04-18 NOTE — TELEPHONE ENCOUNTER
CTA 4/4/19   IMPRESSION:   1.  Short segment stenosis of the proximal left internal carotid artery of approximately 50%.   2.  Interval right internal carotid endarterectomy with caliber normalization.   Will repeat US in 6 months

## 2019-10-25 DIAGNOSIS — I65.23 BILATERAL CAROTID ARTERY STENOSIS: Primary | ICD-10-CM

## 2020-06-11 ENCOUNTER — TELEPHONE (OUTPATIENT)
Dept: VASCULAR SURGERY | Facility: CLINIC | Age: 70
End: 2020-06-11

## 2020-06-11 NOTE — TELEPHONE ENCOUNTER
----- Message from Arabella Betancourt MA sent at 6/11/2020  2:18 PM CDT -----  Contact: pt      ----- Message -----  From: Natali Li  Sent: 6/11/2020   9:47 AM CDT  To: Sofie HART Staff    Pt calling states incision where had surgery 5/7 keeps geting infected and her PCP advised her that she needs to come in and see the Dr ,please soon as possible....[phone]

## 2020-06-11 NOTE — TELEPHONE ENCOUNTER
----- Message from Natali Li sent at 6/11/2020  9:47 AM CDT -----  Contact: pt  Pt calling states incision where had surgery 5/7 keeps geting infected and her PCP advised her that she needs to come in and see the Dr ,please soon as possible....[phone]

## 2020-07-09 ENCOUNTER — OFFICE VISIT (OUTPATIENT)
Dept: CARDIAC SURGERY | Facility: CLINIC | Age: 70
End: 2020-07-09
Payer: MEDICARE

## 2020-07-09 VITALS
BODY MASS INDEX: 25.5 KG/M2 | HEIGHT: 60 IN | SYSTOLIC BLOOD PRESSURE: 153 MMHG | HEART RATE: 68 BPM | DIASTOLIC BLOOD PRESSURE: 67 MMHG | WEIGHT: 129.88 LBS

## 2020-07-09 DIAGNOSIS — Z48.89 ENCOUNTER FOR POSTOPERATIVE WOUND CHECK: Primary | ICD-10-CM

## 2020-07-09 PROCEDURE — 99999 PR PBB SHADOW E&M-EST. PATIENT-LVL III: ICD-10-PCS | Mod: PBBFAC,,, | Performed by: THORACIC SURGERY (CARDIOTHORACIC VASCULAR SURGERY)

## 2020-07-09 PROCEDURE — 99024 PR POST-OP FOLLOW-UP VISIT: ICD-10-PCS | Mod: S$GLB,,, | Performed by: THORACIC SURGERY (CARDIOTHORACIC VASCULAR SURGERY)

## 2020-07-09 PROCEDURE — 99024 POSTOP FOLLOW-UP VISIT: CPT | Mod: S$GLB,,, | Performed by: THORACIC SURGERY (CARDIOTHORACIC VASCULAR SURGERY)

## 2020-07-09 PROCEDURE — 99999 PR PBB SHADOW E&M-EST. PATIENT-LVL III: CPT | Mod: PBBFAC,,, | Performed by: THORACIC SURGERY (CARDIOTHORACIC VASCULAR SURGERY)

## 2020-07-09 NOTE — PROGRESS NOTES
This patient is status post remote coronary artery bypass grafting.  She has developed occasional intermittent drainage from the mid aspect of her sternotomy incision.  The last time this occurred was approximately 3 weeks ago.  She describes the drainage as seropurulent.  It seems to stop spontaneously.  At this point the skin  has healed over.  Her medicines noted and are part of the epic record.  She quit smoking in 2018.  Her problem list was reviewed.  On exam vital signs are stable.  Her sternotomy incision is intact with no skin separation or drainage at this time.  There is no erythema or fluctuance.  I had a discussion with the patient about options at this time and have recommended that if the wound reopened or drainage occurs then the incision should be explored.  I think this is probably a foreign body reaction possibly related to suture material or sternal wire.  The patient seems to be understanding.  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  She takes Lyrica for this with some relief.  She has a palpable pedal pulse. If the pain in her leg persists, I will order an U/S.  I also told the patient to call the office if the sternal drainage recurs and we will schedule an exploration.

## 2024-01-16 ENCOUNTER — TELEPHONE (OUTPATIENT)
Dept: VASCULAR SURGERY | Facility: CLINIC | Age: 74
End: 2024-01-16
Payer: MEDICARE

## 2024-01-16 NOTE — TELEPHONE ENCOUNTER
----- Message from Susana Frankel sent at 1/16/2024  1:34 PM CST -----  States she would like to schedule an appt w/ Dr Carrizales. States she has a whole in her incision, its draining, hard, red, painful and it has an awful smell. Nothing coming up on the schedule. Please call pt [phone]. Thank you

## 2024-01-16 NOTE — TELEPHONE ENCOUNTER
----- Message from Susana Frankel sent at 1/16/2024  1:34 PM CST -----  States she would like to schedule an appt w/ Dr Carrizales. States she has a whole in her incision, its draining, hard, red, painful and it has an awful smell. Nothing coming up on the schedule. Please call pt [phone]. Thank you

## 2024-01-17 NOTE — TELEPHONE ENCOUNTER
----- Message from Netta Manuel sent at 1/17/2024  3:34 PM CST -----  Type:  Patient Returning Call    Who Called:  patient  Who Left Message for Patient:  tyler  Does the patient know what this is regarding?:  [employer]  Best Call Back Number:  [phone]   Additional Information:  patient has a spot opened on her scar-

## 2024-01-24 ENCOUNTER — TELEPHONE (OUTPATIENT)
Dept: VASCULAR SURGERY | Facility: CLINIC | Age: 74
End: 2024-01-24
Payer: MEDICARE
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
## 2024-01-31 ENCOUNTER — OFFICE VISIT (OUTPATIENT)
Dept: VASCULAR SURGERY | Facility: CLINIC | Age: 74
End: 2024-01-31
Payer: MEDICARE

## 2024-01-31 VITALS
HEART RATE: 66 BPM | SYSTOLIC BLOOD PRESSURE: 129 MMHG | BODY MASS INDEX: 25.37 KG/M2 | DIASTOLIC BLOOD PRESSURE: 64 MMHG | HEIGHT: 60 IN

## 2024-01-31 DIAGNOSIS — Z48.89 ENCOUNTER FOR POST SURGICAL WOUND CHECK: Primary | ICD-10-CM

## 2024-01-31 PROCEDURE — 99999 PR PBB SHADOW E&M-EST. PATIENT-LVL III: CPT | Mod: PBBFAC,,, | Performed by: THORACIC SURGERY (CARDIOTHORACIC VASCULAR SURGERY)

## 2024-01-31 PROCEDURE — 99024 POSTOP FOLLOW-UP VISIT: CPT | Mod: S$GLB,,, | Performed by: THORACIC SURGERY (CARDIOTHORACIC VASCULAR SURGERY)

## 2024-01-31 NOTE — PROGRESS NOTES
This patient was referred to the office with a draining wound along the course of her sternotomy incision.  It actually is off the side of the sternotomy incision on the right and a 2nd spot just below the sternotomy incision.  There is no obvious drainage today but the patient states that she has had repeated episodes of drainage from these 2 areas.    Medicines are noted and part of the epic record.  Her problem list was reviewed.    On exam vital signs are stable.  Pupils are equal and round reactive to light.  Neck is supple.  Chest is equal breath sounds.  Heart is in a regular rate and rhythm.  Abdomen is benign.  On the course of the sternotomy incision the skin is well approximated but just off to the side inferiorly she has a puncture site that appears to be the area of concern.  There is no obvious drainage today.  She has a 2nd area below the sternotomy incision just to the left that also has been an issue at times.    Recommendation is for excision of these sites which is likely related to either suture material or foreign body reaction.  This can be done in the near future.

## 2024-02-01 DIAGNOSIS — Z48.89 ENCOUNTER FOR POST SURGICAL WOUND CHECK: Primary | ICD-10-CM

## 2024-02-01 DIAGNOSIS — L08.9 STERNAL WOUND INFECTION: ICD-10-CM

## 2024-02-01 DIAGNOSIS — S21.101A STERNAL WOUND INFECTION: ICD-10-CM

## 2024-02-01 DIAGNOSIS — S21.101A STERNAL WOUND INFECTION: Primary | ICD-10-CM

## 2024-02-01 DIAGNOSIS — L08.9 STERNAL WOUND INFECTION: Primary | ICD-10-CM

## 2024-02-02 ENCOUNTER — TELEPHONE (OUTPATIENT)
Dept: VASCULAR SURGERY | Facility: CLINIC | Age: 74
End: 2024-02-02
Payer: MEDICARE

## 2024-02-02 ENCOUNTER — LAB VISIT (OUTPATIENT)
Dept: LAB | Facility: HOSPITAL | Age: 74
End: 2024-02-02
Attending: THORACIC SURGERY (CARDIOTHORACIC VASCULAR SURGERY)
Payer: MEDICARE

## 2024-02-02 DIAGNOSIS — S21.101A STERNAL WOUND INFECTION: Primary | ICD-10-CM

## 2024-02-02 DIAGNOSIS — S21.101A STERNAL WOUND INFECTION: ICD-10-CM

## 2024-02-02 DIAGNOSIS — L08.9 STERNAL WOUND INFECTION: Primary | ICD-10-CM

## 2024-02-02 DIAGNOSIS — L08.9 STERNAL WOUND INFECTION: ICD-10-CM

## 2024-02-02 LAB
BASOPHILS # BLD AUTO: 0.04 K/UL (ref 0–0.2)
BASOPHILS NFR BLD: 0.6 % (ref 0–1.9)
DIFFERENTIAL METHOD BLD: NORMAL
EOSINOPHIL # BLD AUTO: 0.1 K/UL (ref 0–0.5)
EOSINOPHIL NFR BLD: 2 % (ref 0–8)
ERYTHROCYTE [DISTWIDTH] IN BLOOD BY AUTOMATED COUNT: 13 % (ref 11.5–14.5)
HCT VFR BLD AUTO: 39.7 % (ref 37–48.5)
HGB BLD-MCNC: 12.8 G/DL (ref 12–16)
IMM GRANULOCYTES # BLD AUTO: 0.03 K/UL (ref 0–0.04)
IMM GRANULOCYTES NFR BLD AUTO: 0.4 % (ref 0–0.5)
LYMPHOCYTES # BLD AUTO: 1.9 K/UL (ref 1–4.8)
LYMPHOCYTES NFR BLD: 27.4 % (ref 18–48)
MCH RBC QN AUTO: 31 PG (ref 27–31)
MCHC RBC AUTO-ENTMCNC: 32.2 G/DL (ref 32–36)
MCV RBC AUTO: 96 FL (ref 82–98)
MONOCYTES # BLD AUTO: 0.4 K/UL (ref 0.3–1)
MONOCYTES NFR BLD: 5.8 % (ref 4–15)
NEUTROPHILS # BLD AUTO: 4.4 K/UL (ref 1.8–7.7)
NEUTROPHILS NFR BLD: 63.8 % (ref 38–73)
NRBC BLD-RTO: 0 /100 WBC
PLATELET # BLD AUTO: 223 K/UL (ref 150–450)
PMV BLD AUTO: 12.6 FL (ref 9.2–12.9)
RBC # BLD AUTO: 4.13 M/UL (ref 4–5.4)
WBC # BLD AUTO: 6.86 K/UL (ref 3.9–12.7)

## 2024-02-02 PROCEDURE — 85025 COMPLETE CBC W/AUTO DIFF WBC: CPT | Performed by: THORACIC SURGERY (CARDIOTHORACIC VASCULAR SURGERY)

## 2024-02-02 PROCEDURE — 36415 COLL VENOUS BLD VENIPUNCTURE: CPT | Mod: PO | Performed by: THORACIC SURGERY (CARDIOTHORACIC VASCULAR SURGERY)

## 2024-02-02 PROCEDURE — 80048 BASIC METABOLIC PNL TOTAL CA: CPT | Performed by: THORACIC SURGERY (CARDIOTHORACIC VASCULAR SURGERY)

## 2024-02-02 RX ORDER — MUPIROCIN 20 MG/G
OINTMENT TOPICAL
Status: CANCELLED | OUTPATIENT
Start: 2024-02-02

## 2024-02-02 RX ORDER — CHLORHEXIDINE GLUCONATE ORAL RINSE 1.2 MG/ML
10 SOLUTION DENTAL
Status: CANCELLED | OUTPATIENT
Start: 2024-02-02

## 2024-02-02 RX ORDER — CEFAZOLIN SODIUM 2 G/50ML
2 SOLUTION INTRAVENOUS
Status: CANCELLED | OUTPATIENT
Start: 2024-02-02

## 2024-02-02 RX ORDER — HYDROCODONE BITARTRATE AND ACETAMINOPHEN 500; 5 MG/1; MG/1
TABLET ORAL
Status: CANCELLED | OUTPATIENT
Start: 2024-02-02

## 2024-02-02 RX ORDER — VANCOMYCIN/0.9 % SOD CHLORIDE 1 G/100 ML
1000 PLASTIC BAG, INJECTION (ML) INTRAVENOUS
Status: CANCELLED | OUTPATIENT
Start: 2024-02-02

## 2024-02-02 NOTE — TELEPHONE ENCOUNTER
----- Message from Maryjo Restrepo sent at 2/2/2024  9:50 AM CST -----  Regarding: Call back  Type:  Needs Medical Advice    Who Called: Pt    Would the patient rather a call back or a response via [facility]? Call back    Best Call Back Number: [phone]    Additional Information: Pt is requesting a call back from the nurse. Thank you

## 2024-02-03 LAB
ANION GAP SERPL CALC-SCNC: 8 MMOL/L (ref 8–16)
BUN SERPL-MCNC: 25 MG/DL (ref 8–23)
CALCIUM SERPL-MCNC: 9.3 MG/DL (ref 8.7–10.5)
CHLORIDE SERPL-SCNC: 107 MMOL/L (ref 95–110)
CO2 SERPL-SCNC: 22 MMOL/L (ref 23–29)
CREAT SERPL-MCNC: 0.9 MG/DL (ref 0.5–1.4)
EST. GFR  (NO RACE VARIABLE): >60 ML/MIN/1.73 M^2
GLUCOSE SERPL-MCNC: 125 MG/DL (ref 70–110)
POTASSIUM SERPL-SCNC: 4.9 MMOL/L (ref 3.5–5.1)
SODIUM SERPL-SCNC: 137 MMOL/L (ref 136–145)

## 2024-02-05 ENCOUNTER — TELEPHONE (OUTPATIENT)
Dept: VASCULAR SURGERY | Facility: CLINIC | Age: 74
End: 2024-02-05
Payer: MEDICARE

## 2024-02-05 NOTE — TELEPHONE ENCOUNTER
Patient was told by pre-op that she would need to be pre-medicated with antibiotics prior to surgery. I told her I would double check with Dr Carrizales, but I think the vancomycin before the procedure will be all she needs.  He did call back and confirm this is all she will need prior to surgery              1:26 PM  AKOSUA Carrizales MD

## 2024-02-05 NOTE — TELEPHONE ENCOUNTER
----- Message from Maddi Santana sent at 2/5/2024  1:10 PM CST -----  Type:  Patient Returning Call    Who Called:  pt  Who Left Message for Patient:  Arabella  Does the patient know what this is regarding?:  Yes  Best Call Back Number:  [phone]  Additional Information:  Please call back to advise Thanks!

## 2024-02-05 NOTE — TELEPHONE ENCOUNTER
----- Message from Maddi Santana sent at 2/5/2024  1:10 PM CST -----  Type:  Patient Returning Call    Who Called:  pt  Who Left Message for Patient:  Arabella  Does the patient know what this is regarding?:  Yes  Best Call Back Number:  [phone]  Additional Information:  Please call back to advise Thanks!

## 2024-02-08 PROBLEM — S21.101A STERNAL WOUND INFECTION: Status: ACTIVE | Noted: 2024-02-08

## 2024-02-08 PROBLEM — L08.9 STERNAL WOUND INFECTION: Status: ACTIVE | Noted: 2024-02-08

## 2024-02-15 ENCOUNTER — TELEPHONE (OUTPATIENT)
Dept: VASCULAR SURGERY | Facility: CLINIC | Age: 74
End: 2024-02-15
Payer: MEDICARE

## 2024-02-15 NOTE — TELEPHONE ENCOUNTER
----- Message from Ebonie Hartman sent at 2/15/2024 10:28 AM CST -----  Type:  Needs Medical Advice    Who Called: Patient    Would the patient rather a call back or a response via [facility]? Call    Best Call Back Number: [phone] (home) [phone] (work)    Additional Information: Patient has not been notified about when her stitches are to be removed or when she should have a follow up appt. Please call to advise

## 2024-02-22 ENCOUNTER — OFFICE VISIT (OUTPATIENT)
Dept: VASCULAR SURGERY | Facility: CLINIC | Age: 74
End: 2024-02-22
Payer: MEDICARE

## 2024-02-22 VITALS
HEIGHT: 62 IN | WEIGHT: 138.88 LBS | HEART RATE: 72 BPM | BODY MASS INDEX: 25.55 KG/M2 | SYSTOLIC BLOOD PRESSURE: 132 MMHG | DIASTOLIC BLOOD PRESSURE: 61 MMHG

## 2024-02-22 DIAGNOSIS — Z95.1 S/P CABG X 2: ICD-10-CM

## 2024-02-22 DIAGNOSIS — Z48.89 ENCOUNTER FOR POST SURGICAL WOUND CHECK: Primary | ICD-10-CM

## 2024-02-22 PROCEDURE — 99024 POSTOP FOLLOW-UP VISIT: CPT | Mod: S$GLB,,, | Performed by: THORACIC SURGERY (CARDIOTHORACIC VASCULAR SURGERY)

## 2024-02-22 PROCEDURE — 99999 PR PBB SHADOW E&M-EST. PATIENT-LVL III: CPT | Mod: PBBFAC,,, | Performed by: THORACIC SURGERY (CARDIOTHORACIC VASCULAR SURGERY)

## 2024-02-22 RX ORDER — CEFDINIR 300 MG/1
300 CAPSULE ORAL EVERY 12 HOURS
COMMUNITY
Start: 2024-02-16

## 2024-02-22 RX ORDER — GLIMEPIRIDE 2 MG/1
2 TABLET ORAL 2 TIMES DAILY
COMMUNITY
Start: 2024-02-19

## 2024-02-22 RX ORDER — OXYCODONE AND ACETAMINOPHEN 10; 325 MG/1; MG/1
0.5 TABLET ORAL EVERY 8 HOURS PRN
COMMUNITY

## 2024-02-22 RX ORDER — FENOFIBRATE 160 MG/1
1 TABLET ORAL NIGHTLY
COMMUNITY

## 2024-02-22 NOTE — PROGRESS NOTES
This patient is status post excision of lower sternal wires due to persistent irritation and skin breakdown.    She returns to the office today in follow-up.  She has done well without major complaints.    Medicines are noted and are part of the epic record.  Her problem list was reviewed.    On exam vital signs are stable.  Her surgical wounds clean and dry without evidence of infection.  I think she is doing well status post removal of the lower sternal wires.  She can resume her usual activities without restriction.  She can see us on an as-needed basis.

## 2024-03-11 ENCOUNTER — TELEPHONE (OUTPATIENT)
Dept: VASCULAR SURGERY | Facility: CLINIC | Age: 74
End: 2024-03-11
Payer: MEDICARE

## 2024-03-11 NOTE — TELEPHONE ENCOUNTER
----- Message from Mignon Graham sent at 3/11/2024  3:06 PM CDT -----  Regarding: Needs Medical Advice  Contact: patient at [phone]  Type: Needs Medical Advice  Who Called:  patient at [phone] or [phone]    Symptoms (please be specific):  wound from procedure re-opened on 3/8  Additional Information: Please call and advise. Thank you

## 2024-03-12 ENCOUNTER — OFFICE VISIT (OUTPATIENT)
Dept: VASCULAR SURGERY | Facility: CLINIC | Age: 74
End: 2024-03-12
Payer: MEDICARE

## 2024-03-12 DIAGNOSIS — L08.9 STERNAL WOUND INFECTION: ICD-10-CM

## 2024-03-12 DIAGNOSIS — I10 ESSENTIAL HYPERTENSION: ICD-10-CM

## 2024-03-12 DIAGNOSIS — S21.101A STERNAL WOUND INFECTION: ICD-10-CM

## 2024-03-12 DIAGNOSIS — Z95.1 S/P CABG X 2: Primary | ICD-10-CM

## 2024-03-12 DIAGNOSIS — I25.10 CORONARY ARTERY DISEASE INVOLVING NATIVE CORONARY ARTERY OF NATIVE HEART WITHOUT ANGINA PECTORIS: ICD-10-CM

## 2024-03-12 PROCEDURE — 99024 POSTOP FOLLOW-UP VISIT: CPT | Mod: S$GLB,,, | Performed by: PHYSICIAN ASSISTANT

## 2024-03-12 RX ORDER — SULFAMETHOXAZOLE AND TRIMETHOPRIM 800; 160 MG/1; MG/1
1 TABLET ORAL 2 TIMES DAILY
Qty: 20 TABLET | Refills: 0 | Status: SHIPPED | OUTPATIENT
Start: 2024-03-12 | End: 2024-03-22

## 2024-03-12 NOTE — PROGRESS NOTES
Subjective:       Patient ID: Liliana Calderon is a 73 y.o. female is a patient of  Dr. Carrizales who underwent sternal wire removal on 2/8/24. She did well post operatively, but last Friday she reports the distal aspect of her incision became red, then purple, with underlying induration and erythema. A small (1mm) hole opened with significant serosanguinous drainage. She reports being able to express fluid from the hole for several days. No fevers or chills, no purulent or foul-smelling drainage.           Objective:     GEN: WD WN in NAD  HEART: RRR. No murmur  CHEST: Lungs clear in all fields. Sternal incision healing well, clean and dry, well approximated. Sternum stable. 1 mm opening at distal aspect of incision. No surrounding warmth, erythema, or induration. Unable to express any drainage from wound.     EXTREMITY: No edema    Assessment:     Problem List Items Addressed This Visit          Cardiology Problems    Coronary artery disease involving native coronary artery of native heart without angina pectoris    Essential hypertension       Other    S/P CABG x 2 - Primary    Sternal wound infection       Plan:       Bactrim DS x 10 days.   Keep wound clean and dry. Cover if actively draining.   Continue current medications  Advance activity as discussed  Continue to monitor incision for healing  Follow up prn.

## 2024-04-23 ENCOUNTER — TELEPHONE (OUTPATIENT)
Dept: VASCULAR SURGERY | Facility: CLINIC | Age: 74
End: 2024-04-23
Payer: MEDICARE

## 2024-04-23 NOTE — TELEPHONE ENCOUNTER
Patient called to say that oncologist has approved for her to take antibiotic.  Dr Carrizales wants her to take Bactrim DS #20 BID  And was called in to [facility]. To He  She will report her progress

## 2024-04-23 NOTE — TELEPHONE ENCOUNTER
Patient called to advise us the her chest incision has opened up again.  She states this started on Friday morning, but about 2 weeks ago the incision was red and had like a black head on it and when she scraped it with her fingernail a piece of wire came out of it but she dropped it and could not find it.  I offered her an appointment tomorrow but she declined because she has started chemo for lung cancer.  Dr Carrizales offered to call in Bactrim but she states her oncologist did not want her on antibiotics. I advised her to contact her and let her know what is going and and to let us know what she wants to do, but something needs to be done to prevent further infection.

## 2024-04-23 NOTE — TELEPHONE ENCOUNTER
----- Message from Chasidy Aguilar sent at 4/23/2024  1:52 PM CDT -----  Contact: self  Type:  Needs Medical Advice    Who Called: self  Symptoms (please be specific): pt is needing to speak to Arabella regarding her incision, part of incision is opening up again.    Would the patient rather a call back or a response via [facility]? call  Best Call Back Number:    Additional Information: please advise and thank you.

## 2024-05-20 ENCOUNTER — TELEPHONE (OUTPATIENT)
Dept: VASCULAR SURGERY | Facility: CLINIC | Age: 74
End: 2024-05-20
Payer: MEDICARE

## 2024-05-20 DIAGNOSIS — T81.31XA DISRUPTION OF EXTERNAL SURGICAL WOUND, INITIAL ENCOUNTER: Primary | ICD-10-CM

## 2024-05-20 RX ORDER — CLINDAMYCIN HYDROCHLORIDE 300 MG/1
300 CAPSULE ORAL 2 TIMES DAILY
Qty: 28 CAPSULE | Refills: 0 | Status: SHIPPED | OUTPATIENT
Start: 2024-05-20 | End: 2024-06-03

## 2024-05-20 NOTE — TELEPHONE ENCOUNTER
Dr Carrizales notified and recommended Clindamycin 300mg BID X 10 days and OV on 5/30.  She has been using antibacterial wipes on incision,but I recommended only Dial antibacterial soup to cleanse the area

## 2024-05-20 NOTE — TELEPHONE ENCOUNTER
----- Message from Bharti Munoz sent at 5/20/2024  8:28 AM CDT -----  Contact: Self  Type: Needs Medical Advice    Who Called:  Patient for Arabella   What is this regarding?:  Her incision was just reopened that was giving her problems for 6 yrs since her last surgery. Another place opened up and never healed since her antibiotics. She woke up with pain and it was draining yesterday morning. It was draining white pussy stuff all over her chest.  Best Call Back Number:  [phone]  Additional Information:  Please call the patient back at the phone number listed above to advise. Thank you!

## 2024-05-30 ENCOUNTER — OFFICE VISIT (OUTPATIENT)
Dept: VASCULAR SURGERY | Facility: CLINIC | Age: 74
End: 2024-05-30
Payer: MEDICARE

## 2024-05-30 VITALS
SYSTOLIC BLOOD PRESSURE: 139 MMHG | BODY MASS INDEX: 25.28 KG/M2 | WEIGHT: 136 LBS | DIASTOLIC BLOOD PRESSURE: 65 MMHG | HEART RATE: 80 BPM

## 2024-05-30 DIAGNOSIS — Z48.89 ENCOUNTER FOR POST SURGICAL WOUND CHECK: Primary | ICD-10-CM

## 2024-05-30 PROCEDURE — 1126F AMNT PAIN NOTED NONE PRSNT: CPT | Mod: CPTII,S$GLB,, | Performed by: THORACIC SURGERY (CARDIOTHORACIC VASCULAR SURGERY)

## 2024-05-30 PROCEDURE — 1101F PT FALLS ASSESS-DOCD LE1/YR: CPT | Mod: CPTII,S$GLB,, | Performed by: THORACIC SURGERY (CARDIOTHORACIC VASCULAR SURGERY)

## 2024-05-30 PROCEDURE — 99999 PR PBB SHADOW E&M-EST. PATIENT-LVL II: CPT | Mod: PBBFAC,,, | Performed by: THORACIC SURGERY (CARDIOTHORACIC VASCULAR SURGERY)

## 2024-05-30 PROCEDURE — 1160F RVW MEDS BY RX/DR IN RCRD: CPT | Mod: CPTII,S$GLB,, | Performed by: THORACIC SURGERY (CARDIOTHORACIC VASCULAR SURGERY)

## 2024-05-30 PROCEDURE — 3288F FALL RISK ASSESSMENT DOCD: CPT | Mod: CPTII,S$GLB,, | Performed by: THORACIC SURGERY (CARDIOTHORACIC VASCULAR SURGERY)

## 2024-05-30 PROCEDURE — 99024 POSTOP FOLLOW-UP VISIT: CPT | Mod: S$GLB,,, | Performed by: THORACIC SURGERY (CARDIOTHORACIC VASCULAR SURGERY)

## 2024-05-30 PROCEDURE — 3075F SYST BP GE 130 - 139MM HG: CPT | Mod: CPTII,S$GLB,, | Performed by: THORACIC SURGERY (CARDIOTHORACIC VASCULAR SURGERY)

## 2024-05-30 PROCEDURE — 1159F MED LIST DOCD IN RCRD: CPT | Mod: CPTII,S$GLB,, | Performed by: THORACIC SURGERY (CARDIOTHORACIC VASCULAR SURGERY)

## 2024-05-30 PROCEDURE — 3078F DIAST BP <80 MM HG: CPT | Mod: CPTII,S$GLB,, | Performed by: THORACIC SURGERY (CARDIOTHORACIC VASCULAR SURGERY)

## 2024-05-30 PROCEDURE — 3051F HG A1C>EQUAL 7.0%<8.0%: CPT | Mod: CPTII,S$GLB,, | Performed by: THORACIC SURGERY (CARDIOTHORACIC VASCULAR SURGERY)

## 2024-05-30 RX ORDER — SULFAMETHOXAZOLE AND TRIMETHOPRIM 800; 160 MG/1; MG/1
1 TABLET ORAL 2 TIMES DAILY
COMMUNITY
Start: 2024-05-29

## 2024-05-30 NOTE — PROGRESS NOTES
This patient has history of coronary artery bypass grafting remotely.  She developed a draining wound at the lower aspect of the sternotomy incision within the last few months.  She underwent exploration of the wound and removal of the lower sternal wires hoping that this would heal the wound.  However the wound has returned and has drained  seropurulent material over the last few days.    Medicines are part of the epic record.  Her problem list was reviewed.  She does have a diagnosis of a lung cancer and is presently undergoing chemotherapy.    On exam vital signs are stable.  Pupils are equal and round reactive to light.  Neck is supple.  Chest is equal breath sounds.  On examination of her sternotomy incision it is almost entirely intact except for the very distal aspect of the skin incision where there is a punctate opening that has been draining the material.  She has a Band-Aid on the site and apparently the drainage is intermittent but certainly there is no abscess or fluctuance.    Recommendation is to again excise the wound and remove any debris or foreign body.  The patient seems to be understanding and agreeable.

## 2024-06-26 ENCOUNTER — TELEPHONE (OUTPATIENT)
Dept: VASCULAR SURGERY | Facility: CLINIC | Age: 74
End: 2024-06-26
Payer: MEDICARE

## 2024-06-26 NOTE — TELEPHONE ENCOUNTER
----- Message from Nadja Howard sent at 6/26/2024  2:55 PM CDT -----  Contact: Pt [phone]  Type:  Patient Returning Call    Who Called:  Pt   Who Left Message for Patient:  Arabella   Does the patient know what this is regarding?:  not sure  Best Call Back Number:  [phone]     Additional Information:  Pt stated she missed a call this morning

## 2024-06-26 NOTE — TELEPHONE ENCOUNTER
Called by mistake, it was an old message,  She states her incision is doing better, and will call back when she is completed chemo or if incision worsens

## 2024-06-27 ENCOUNTER — TELEPHONE (OUTPATIENT)
Dept: VASCULAR SURGERY | Facility: CLINIC | Age: 74
End: 2024-06-27
Payer: MEDICARE

## 2024-06-27 NOTE — TELEPHONE ENCOUNTER
----- Message from Mia Allen sent at 6/27/2024  1:45 PM CDT -----  Type:  Needs Medical Advice    Who Called: pt    Symptoms (please be specific): na     How long has patient had these symptoms:  yovani    Pharmacy name and phone #:  na    Would the patient rather a call back or a response via [facility]? Call back    Best Call Back Number: [phone]      Additional Information: pt gave no information is just asking for a call from Arabella      Please call Back to advise. Thanks!

## 2024-06-27 NOTE — TELEPHONE ENCOUNTER
Called to let us know her sternotomy incision has another abscess. She was not able to complete the Bactrim last time due to diarrhea. She does not need anymore chemo at this time and is going to see the surgeon in [place] tomorrow to discuss the l/upper lobe lobectomy. I told her to let him check it and see what he wants to do about doing this surgery or wait until after this is treated. She will let us know tomorrow/

## 2024-07-11 ENCOUNTER — OFFICE VISIT (OUTPATIENT)
Dept: VASCULAR SURGERY | Facility: CLINIC | Age: 74
End: 2024-07-11
Payer: MEDICARE

## 2024-07-11 VITALS — HEIGHT: 65 IN | BODY MASS INDEX: 22.61 KG/M2 | WEIGHT: 135.69 LBS

## 2024-07-11 DIAGNOSIS — T81.89XA PROBLEM INVOLVING SURGICAL INCISION: Primary | ICD-10-CM

## 2024-07-11 PROCEDURE — 99999 PR PBB SHADOW E&M-EST. PATIENT-LVL III: CPT | Mod: PBBFAC,,, | Performed by: THORACIC SURGERY (CARDIOTHORACIC VASCULAR SURGERY)

## 2024-07-11 PROCEDURE — 3288F FALL RISK ASSESSMENT DOCD: CPT | Mod: CPTII,S$GLB,, | Performed by: THORACIC SURGERY (CARDIOTHORACIC VASCULAR SURGERY)

## 2024-07-11 PROCEDURE — 1160F RVW MEDS BY RX/DR IN RCRD: CPT | Mod: CPTII,S$GLB,, | Performed by: THORACIC SURGERY (CARDIOTHORACIC VASCULAR SURGERY)

## 2024-07-11 PROCEDURE — 1101F PT FALLS ASSESS-DOCD LE1/YR: CPT | Mod: CPTII,S$GLB,, | Performed by: THORACIC SURGERY (CARDIOTHORACIC VASCULAR SURGERY)

## 2024-07-11 PROCEDURE — 99024 POSTOP FOLLOW-UP VISIT: CPT | Mod: S$GLB,,, | Performed by: THORACIC SURGERY (CARDIOTHORACIC VASCULAR SURGERY)

## 2024-07-11 PROCEDURE — 3051F HG A1C>EQUAL 7.0%<8.0%: CPT | Mod: CPTII,S$GLB,, | Performed by: THORACIC SURGERY (CARDIOTHORACIC VASCULAR SURGERY)

## 2024-07-11 PROCEDURE — 1125F AMNT PAIN NOTED PAIN PRSNT: CPT | Mod: CPTII,S$GLB,, | Performed by: THORACIC SURGERY (CARDIOTHORACIC VASCULAR SURGERY)

## 2024-07-11 PROCEDURE — 1159F MED LIST DOCD IN RCRD: CPT | Mod: CPTII,S$GLB,, | Performed by: THORACIC SURGERY (CARDIOTHORACIC VASCULAR SURGERY)

## 2024-07-11 NOTE — PROGRESS NOTES
This patient is status post remote coronary artery bypass grafting.  She developed a draining sinus wound at the lower aspect of her sternotomy incision within the last year.  She had an operative procedure to excise and drain the site.  Her sternal wires were removed as well.    She had been doing well but developed another draining sinus within the last week to 10 days.  She states that it drained and then began to close with little to no residual wound or abscess.  She comes back today in follow-up.    On exam vital signs are stable.  Her sternotomy incision is largely intact.  The very lower aspect has a tiny opening with no obvious erythema.  I was able to express a tiny amount of seropurulent material.    I would recommend conservative management at this time.    She has history of lung cancer and I think she can proceed with resection of the left upper lobe as recommended by her oncologist.  If necessary the sternotomy sinus can be reexcised.

## 2024-10-31 ENCOUNTER — TELEPHONE (OUTPATIENT)
Dept: VASCULAR SURGERY | Facility: CLINIC | Age: 74
End: 2024-10-31
Payer: MEDICARE

## 2024-10-31 NOTE — TELEPHONE ENCOUNTER
----- Message from Giovana sent at 10/31/2024  2:30 PM CDT -----  Regarding: patient call back  Type: Patient Call Back    Who called: Self     What is the request in detail: asked for a call back from the nurse to discuss her infectious disease apt     Can the clinic reply by [facility]? No     Would the patient rather a call back or a response via [facility]? Call     Best call back number: .[phone]

## 2024-11-14 ENCOUNTER — OFFICE VISIT (OUTPATIENT)
Dept: VASCULAR SURGERY | Facility: CLINIC | Age: 74
End: 2024-11-14
Payer: MEDICARE

## 2024-11-14 VITALS — DIASTOLIC BLOOD PRESSURE: 51 MMHG | SYSTOLIC BLOOD PRESSURE: 122 MMHG | HEART RATE: 64 BPM

## 2024-11-14 DIAGNOSIS — Z48.89 ENCOUNTER FOR POST SURGICAL WOUND CHECK: Primary | ICD-10-CM

## 2024-11-14 PROCEDURE — 3074F SYST BP LT 130 MM HG: CPT | Mod: CPTII,S$GLB,, | Performed by: THORACIC SURGERY (CARDIOTHORACIC VASCULAR SURGERY)

## 2024-11-14 PROCEDURE — 3078F DIAST BP <80 MM HG: CPT | Mod: CPTII,S$GLB,, | Performed by: THORACIC SURGERY (CARDIOTHORACIC VASCULAR SURGERY)

## 2024-11-14 PROCEDURE — 1100F PTFALLS ASSESS-DOCD GE2>/YR: CPT | Mod: CPTII,S$GLB,, | Performed by: THORACIC SURGERY (CARDIOTHORACIC VASCULAR SURGERY)

## 2024-11-14 PROCEDURE — 1160F RVW MEDS BY RX/DR IN RCRD: CPT | Mod: CPTII,S$GLB,, | Performed by: THORACIC SURGERY (CARDIOTHORACIC VASCULAR SURGERY)

## 2024-11-14 PROCEDURE — 3288F FALL RISK ASSESSMENT DOCD: CPT | Mod: CPTII,S$GLB,, | Performed by: THORACIC SURGERY (CARDIOTHORACIC VASCULAR SURGERY)

## 2024-11-14 PROCEDURE — 99999 PR PBB SHADOW E&M-EST. PATIENT-LVL III: CPT | Mod: PBBFAC,,, | Performed by: THORACIC SURGERY (CARDIOTHORACIC VASCULAR SURGERY)

## 2024-11-14 PROCEDURE — 99213 OFFICE O/P EST LOW 20 MIN: CPT | Mod: S$GLB,,, | Performed by: THORACIC SURGERY (CARDIOTHORACIC VASCULAR SURGERY)

## 2024-11-14 PROCEDURE — 1159F MED LIST DOCD IN RCRD: CPT | Mod: CPTII,S$GLB,, | Performed by: THORACIC SURGERY (CARDIOTHORACIC VASCULAR SURGERY)

## 2024-11-14 NOTE — PROGRESS NOTES
This patient is status post coronary artery bypass grafting remotely.  She had developed a draining sinus at the lower aspect of her sternotomy incision which has been excised and closed on 2 occasions.  In the interim now she has had treatment for lung cancer including chemotherapy and radiation.  She states that she still has some issue with the sinus at the lower aspect of the sternotomy incision.  She is almost finished a course of Bactrim.  On exam vital signs are stable.  On examination of the sternotomy incision there is a tiny spot on the lower aspect of the incision that has a small scab that appears to be healed over.  There is no evidence of an infection or deeper inflammatory process.    I would recommend ongoing antibiotic therapy with Bactrim until she is finished.  I told her to let us know if the problem recurs and give us a call.

## 2025-02-06 ENCOUNTER — TELEPHONE (OUTPATIENT)
Dept: VASCULAR SURGERY | Facility: CLINIC | Age: 75
End: 2025-02-06
Payer: MEDICARE

## 2025-02-06 NOTE — TELEPHONE ENCOUNTER
----- Message from Jono sent at 2/6/2025  4:00 PM CST -----  Type: Needs Medical Advice    Who Called:  Pt    Best Call Back Number: [phone]    Additional Information: Pt calling to speak with nurse scott about being seen.  Please call back to advise, Thanks!

## 2025-02-06 NOTE — TELEPHONE ENCOUNTER
----- Message from Zahra sent at 2/6/2025  4:31 PM CST -----  Contact: Self  Type:  Patient Returning Call    Who Called:  Patient  Who Left Message for Patient:  Arabella  Does the patient know what this is regarding?:  yes, making an appt  Best Call Back Number:  [phone]  Additional Information:  Pt stated she just missed her call, can we please call pt back to assist with scheduling with Dr Carrizales. Thank you.

## 2025-02-07 NOTE — TELEPHONE ENCOUNTER
Patient calling stating she has started with drainage of the sinus at the lower aspect of her sternotomy incision again. This started over 4 weeks ago and has been continuing to drain.  She was on antibiotics for tooth infection last week but stopped them due to diarrhea.  OV scheduled for 2/19 in [place]

## 2025-02-07 NOTE — TELEPHONE ENCOUNTER
----- Message from Norma sent at 2/7/2025  3:22 PM CST -----  Contact: self  Type:  Patient Returning Call    Who Called:  pt  Who Left Message for Patient:  tyler  Does the patient know what this is regarding?:  yes  Best Call Back Number:  [phone]   Additional Information:  please call

## 2025-02-19 ENCOUNTER — OFFICE VISIT (OUTPATIENT)
Dept: VASCULAR SURGERY | Facility: CLINIC | Age: 75
End: 2025-02-19
Payer: MEDICARE

## 2025-02-19 VITALS — DIASTOLIC BLOOD PRESSURE: 55 MMHG | SYSTOLIC BLOOD PRESSURE: 112 MMHG | HEART RATE: 70 BPM

## 2025-02-19 DIAGNOSIS — Z48.89 ENCOUNTER FOR POST SURGICAL WOUND CHECK: Primary | ICD-10-CM

## 2025-02-19 PROCEDURE — 1126F AMNT PAIN NOTED NONE PRSNT: CPT | Mod: CPTII,S$GLB,, | Performed by: THORACIC SURGERY (CARDIOTHORACIC VASCULAR SURGERY)

## 2025-02-19 PROCEDURE — 3078F DIAST BP <80 MM HG: CPT | Mod: CPTII,S$GLB,, | Performed by: THORACIC SURGERY (CARDIOTHORACIC VASCULAR SURGERY)

## 2025-02-19 PROCEDURE — 1101F PT FALLS ASSESS-DOCD LE1/YR: CPT | Mod: CPTII,S$GLB,, | Performed by: THORACIC SURGERY (CARDIOTHORACIC VASCULAR SURGERY)

## 2025-02-19 PROCEDURE — 99214 OFFICE O/P EST MOD 30 MIN: CPT | Mod: S$GLB,,, | Performed by: THORACIC SURGERY (CARDIOTHORACIC VASCULAR SURGERY)

## 2025-02-19 PROCEDURE — 3074F SYST BP LT 130 MM HG: CPT | Mod: CPTII,S$GLB,, | Performed by: THORACIC SURGERY (CARDIOTHORACIC VASCULAR SURGERY)

## 2025-02-19 PROCEDURE — 1159F MED LIST DOCD IN RCRD: CPT | Mod: CPTII,S$GLB,, | Performed by: THORACIC SURGERY (CARDIOTHORACIC VASCULAR SURGERY)

## 2025-02-19 PROCEDURE — 3288F FALL RISK ASSESSMENT DOCD: CPT | Mod: CPTII,S$GLB,, | Performed by: THORACIC SURGERY (CARDIOTHORACIC VASCULAR SURGERY)

## 2025-02-19 RX ORDER — METOPROLOL SUCCINATE 25 MG/1
25 TABLET, EXTENDED RELEASE ORAL
COMMUNITY
Start: 2024-12-08

## 2025-02-19 RX ORDER — LIDOCAINE AND PRILOCAINE 25; 25 MG/G; MG/G
CREAM TOPICAL
COMMUNITY
Start: 2025-02-13

## 2025-02-19 RX ORDER — FOLIC ACID 1 MG/1
1000 TABLET ORAL
COMMUNITY
Start: 2024-12-30

## 2025-02-19 RX ORDER — LEVOTHYROXINE SODIUM 50 UG/1
50 TABLET ORAL
COMMUNITY
Start: 2024-12-30

## 2025-02-19 RX ORDER — RAMIPRIL 10 MG/1
10 CAPSULE ORAL
COMMUNITY
Start: 2024-12-12

## 2025-02-19 RX ORDER — LANOLIN ALCOHOL/MO/W.PET/CERES
1 CREAM (GRAM) TOPICAL 3 TIMES DAILY
COMMUNITY
Start: 2024-12-21

## 2025-02-19 RX ORDER — LEVOTHYROXINE SODIUM 100 UG/1
100 TABLET ORAL
COMMUNITY
Start: 2025-02-14

## 2025-02-19 RX ORDER — ESTRADIOL 0.05 MG/D
1 PATCH TRANSDERMAL
COMMUNITY
Start: 2024-12-04

## 2025-02-19 RX ORDER — DIPHENOXYLATE HYDROCHLORIDE AND ATROPINE SULFATE 2.5; .025 MG/1; MG/1
1 TABLET ORAL 4 TIMES DAILY PRN
COMMUNITY
Start: 2025-01-02

## 2025-02-19 NOTE — PROGRESS NOTES
This patient is status post remote coronary artery bypass grafting.  She had persistent sinus wound drainage from the lower aspect of the sternotomy incision.  This has recurred on a regular basis over the last year.  She comes back today in follow-up.  Fortunately it appears that the site has healed with no drainage or evidence of abscess.    On exam vital signs are stable.  Medicines are noted.    She is going through therapy for lung cancer.  She presently is on immunotherapy.  On exam vital signs are stable.  The sternotomy incision is intact.  There was no evidence of active infection.  The site that had been draining has epithelialized.  There is no erythema or fluctuance.    I would treat this expectantly.  If the problem recurs she will call the office.  She is scheduled for a CT scan later this month secondary to her lung cancer treatment.  This can be reviewed if they are still an issue with the sternotomy incision.

## 2025-03-05 ENCOUNTER — TELEPHONE (OUTPATIENT)
Dept: VASCULAR SURGERY | Facility: CLINIC | Age: 75
End: 2025-03-05
Payer: MEDICARE

## 2025-03-05 RX ORDER — SULFAMETHOXAZOLE AND TRIMETHOPRIM 800; 160 MG/1; MG/1
1 TABLET ORAL 2 TIMES DAILY
Qty: 28 TABLET | Refills: 0 | Status: SHIPPED | OUTPATIENT
Start: 2025-03-05 | End: 2025-03-19

## 2025-03-05 NOTE — TELEPHONE ENCOUNTER
----- Message from Jono sent at 3/5/2025 11:39 AM CST -----  Type: Needs Medical AdviceWho Called:  PtBest Call Back Number: [phone] Information: Pt wants to speak with tyler as the hole in her chest opened up again.  Please call back to advise, Thanks!

## 2025-03-05 NOTE — TELEPHONE ENCOUNTER
Spoke with Ms Calderon who stated she woke up 5 nights ago and the bed sheet under her was saturated with bright red blood that was coming from the opening in her sternum-Then 3 days ago the drainage changed to strictly purulent, in appearance. Ms Calderon also stated she has been having chills and last night recorded an oral temperature of 101.3. I messaged KIRSTY Camacho concerning this and she stated that she could see Ms Calderon either tomorrow or Friday in the clinic. When I spoke with Ms Calderon to advise of Janice's message, she stated she cannot come tomorrow, as she has scheduled an appt with Dr Osman, her PCP in [place] due to the febrile illness, and now she also has a sore throat, and is generally feeling bad. I offered to get her scheduled Friday morning with either Janice or Dr Carrizales, and she stated she was not able to come Friday, as her  has some repairs scheduled on their house and He needs to be present in the home. Advised Janice of above. She e-scribed some oral [facility] the [facility] [place].Ms Calderon advised of the prescription, and also that Dr Carrizales will be back in [facility] Friday morning, at which time I will discuss these complaints, as well as the recent CT scan results. Understanding verbalized on all instructions given.

## 2025-03-07 ENCOUNTER — TELEPHONE (OUTPATIENT)
Dept: VASCULAR SURGERY | Facility: CLINIC | Age: 75
End: 2025-03-07
Payer: MEDICARE

## 2025-03-07 DIAGNOSIS — L08.9 STERNAL WOUND INFECTION: Primary | ICD-10-CM

## 2025-03-07 DIAGNOSIS — I10 ESSENTIAL HYPERTENSION: Primary | ICD-10-CM

## 2025-03-07 DIAGNOSIS — S21.101A STERNAL WOUND INFECTION: Primary | ICD-10-CM

## 2025-03-07 DIAGNOSIS — T81.89XA PROBLEM INVOLVING SURGICAL INCISION: ICD-10-CM

## 2025-03-07 DIAGNOSIS — S21.101A STERNAL WOUND INFECTION: ICD-10-CM

## 2025-03-07 DIAGNOSIS — Z95.1 S/P CABG X 2: ICD-10-CM

## 2025-03-07 DIAGNOSIS — T81.31XA DISRUPTION OF EXTERNAL SURGICAL WOUND, INITIAL ENCOUNTER: ICD-10-CM

## 2025-03-07 DIAGNOSIS — L08.9 STERNAL WOUND INFECTION: ICD-10-CM

## 2025-03-07 NOTE — TELEPHONE ENCOUNTER
Labs 3/10 in [place]  Phone pre-op  Procedure 3/12 @ [facility]   Patient is not on any blood thinners

## 2025-03-10 ENCOUNTER — LAB VISIT (OUTPATIENT)
Dept: LAB | Facility: HOSPITAL | Age: 75
End: 2025-03-10
Payer: MEDICARE

## 2025-03-10 DIAGNOSIS — L08.9 STERNAL WOUND INFECTION: Primary | ICD-10-CM

## 2025-03-10 DIAGNOSIS — L08.9 STERNAL WOUND INFECTION: ICD-10-CM

## 2025-03-10 DIAGNOSIS — I10 ESSENTIAL HYPERTENSION: ICD-10-CM

## 2025-03-10 DIAGNOSIS — T81.89XA PROBLEM INVOLVING SURGICAL INCISION: ICD-10-CM

## 2025-03-10 DIAGNOSIS — S21.101A STERNAL WOUND INFECTION: ICD-10-CM

## 2025-03-10 DIAGNOSIS — S21.101A STERNAL WOUND INFECTION: Primary | ICD-10-CM

## 2025-03-10 LAB
ANION GAP SERPL CALC-SCNC: 8 MMOL/L (ref 8–16)
BASOPHILS # BLD AUTO: 0.03 K/UL (ref 0–0.2)
BASOPHILS NFR BLD: 0.5 % (ref 0–1.9)
BUN SERPL-MCNC: 29 MG/DL (ref 8–23)
CALCIUM SERPL-MCNC: 8.7 MG/DL (ref 8.7–10.5)
CHLORIDE SERPL-SCNC: 107 MMOL/L (ref 95–110)
CO2 SERPL-SCNC: 19 MMOL/L (ref 23–29)
CREAT SERPL-MCNC: 1.1 MG/DL (ref 0.5–1.4)
DIFFERENTIAL METHOD BLD: ABNORMAL
EOSINOPHIL # BLD AUTO: 0.2 K/UL (ref 0–0.5)
EOSINOPHIL NFR BLD: 3.8 % (ref 0–8)
ERYTHROCYTE [DISTWIDTH] IN BLOOD BY AUTOMATED COUNT: 12.9 % (ref 11.5–14.5)
EST. GFR  (NO RACE VARIABLE): 52.1 ML/MIN/1.73 M^2
GLUCOSE SERPL-MCNC: 91 MG/DL (ref 70–110)
HCT VFR BLD AUTO: 33.9 % (ref 37–48.5)
HGB BLD-MCNC: 10.6 G/DL (ref 12–16)
IMM GRANULOCYTES # BLD AUTO: 0.05 K/UL (ref 0–0.04)
IMM GRANULOCYTES NFR BLD AUTO: 0.9 % (ref 0–0.5)
LYMPHOCYTES # BLD AUTO: 0.6 K/UL (ref 1–4.8)
LYMPHOCYTES NFR BLD: 10.5 % (ref 18–48)
MCH RBC QN AUTO: 31.5 PG (ref 27–31)
MCHC RBC AUTO-ENTMCNC: 31.3 G/DL (ref 32–36)
MCV RBC AUTO: 101 FL (ref 82–98)
MONOCYTES # BLD AUTO: 0.4 K/UL (ref 0.3–1)
MONOCYTES NFR BLD: 7 % (ref 4–15)
NEUTROPHILS # BLD AUTO: 4.4 K/UL (ref 1.8–7.7)
NEUTROPHILS NFR BLD: 77.3 % (ref 38–73)
NRBC BLD-RTO: 0 /100 WBC
PLATELET # BLD AUTO: 161 K/UL (ref 150–450)
PMV BLD AUTO: 12.2 FL (ref 9.2–12.9)
POTASSIUM SERPL-SCNC: 4.9 MMOL/L (ref 3.5–5.1)
RBC # BLD AUTO: 3.36 M/UL (ref 4–5.4)
SODIUM SERPL-SCNC: 134 MMOL/L (ref 136–145)
WBC # BLD AUTO: 5.73 K/UL (ref 3.9–12.7)

## 2025-03-10 PROCEDURE — 36415 COLL VENOUS BLD VENIPUNCTURE: CPT | Mod: PO | Performed by: THORACIC SURGERY (CARDIOTHORACIC VASCULAR SURGERY)

## 2025-03-10 PROCEDURE — 85025 COMPLETE CBC W/AUTO DIFF WBC: CPT | Performed by: THORACIC SURGERY (CARDIOTHORACIC VASCULAR SURGERY)

## 2025-03-10 PROCEDURE — 80048 BASIC METABOLIC PNL TOTAL CA: CPT | Performed by: THORACIC SURGERY (CARDIOTHORACIC VASCULAR SURGERY)

## 2025-03-10 RX ORDER — CHLORHEXIDINE GLUCONATE ORAL RINSE 1.2 MG/ML
10 SOLUTION DENTAL
Status: CANCELLED | OUTPATIENT
Start: 2025-03-10

## 2025-03-10 RX ORDER — MUPIROCIN 20 MG/G
OINTMENT TOPICAL
Status: CANCELLED | OUTPATIENT
Start: 2025-03-10

## 2025-03-10 RX ORDER — VANCOMYCIN/0.9 % SOD CHLORIDE 1 G/100 ML
1000 PLASTIC BAG, INJECTION (ML) INTRAVENOUS
Status: CANCELLED | OUTPATIENT
Start: 2025-03-12

## 2025-03-19 ENCOUNTER — TELEPHONE (OUTPATIENT)
Dept: VASCULAR SURGERY | Facility: CLINIC | Age: 75
End: 2025-03-19
Payer: MEDICARE

## 2025-03-19 NOTE — TELEPHONE ENCOUNTER
----- Message from Tracey sent at 3/19/2025 12:34 PM CDT -----  Regarding: Needs Medical Advice  Contact: [employer] at [phone] option 3  Type: Needs Medical AdviceWho Called:  [facility] at [phone] option 3 Additional Information: wants some clarification on what kind surgery the patient had. Please call and advise. Thank you.

## 2025-03-19 NOTE — TELEPHONE ENCOUNTER
Spoke with Radha who wanted to confirm whether the recent procedure that Mrs Calderon had done was inpatient or outpatient. Advised that is was originally scheduled as an inpatient procedure, but the hospital changed it to an outpatient procedure, and the patient went home the same evening.

## 2025-03-19 NOTE — TELEPHONE ENCOUNTER
----- Message from Alka sent at 3/19/2025  4:20 PM CDT -----  Regarding: return call  Contact: Gaye with [facility]  Type: Needs Medical AdviceWho Called:  Naheed with [facility]Symptoms (please be specific):  How long has patient had these symptoms:  Pharmacy name and phone #:  Best Call Back Number: [phone] opt 3Additional Information: Naheed would like to speak to Jonathan again.  Thanks!

## 2025-03-20 ENCOUNTER — OFFICE VISIT (OUTPATIENT)
Dept: VASCULAR SURGERY | Facility: CLINIC | Age: 75
End: 2025-03-20
Payer: MEDICARE

## 2025-03-20 VITALS
DIASTOLIC BLOOD PRESSURE: 50 MMHG | HEART RATE: 72 BPM | WEIGHT: 128.31 LBS | SYSTOLIC BLOOD PRESSURE: 88 MMHG | BODY MASS INDEX: 24.65 KG/M2

## 2025-03-20 DIAGNOSIS — T81.49XA INFLAMMATION OF OPERATIVE INCISION: Primary | ICD-10-CM

## 2025-03-20 PROCEDURE — 1159F MED LIST DOCD IN RCRD: CPT | Mod: CPTII,S$GLB,, | Performed by: THORACIC SURGERY (CARDIOTHORACIC VASCULAR SURGERY)

## 2025-03-20 PROCEDURE — 3078F DIAST BP <80 MM HG: CPT | Mod: CPTII,S$GLB,, | Performed by: THORACIC SURGERY (CARDIOTHORACIC VASCULAR SURGERY)

## 2025-03-20 PROCEDURE — 3074F SYST BP LT 130 MM HG: CPT | Mod: CPTII,S$GLB,, | Performed by: THORACIC SURGERY (CARDIOTHORACIC VASCULAR SURGERY)

## 2025-03-20 PROCEDURE — 1101F PT FALLS ASSESS-DOCD LE1/YR: CPT | Mod: CPTII,S$GLB,, | Performed by: THORACIC SURGERY (CARDIOTHORACIC VASCULAR SURGERY)

## 2025-03-20 PROCEDURE — 1125F AMNT PAIN NOTED PAIN PRSNT: CPT | Mod: CPTII,S$GLB,, | Performed by: THORACIC SURGERY (CARDIOTHORACIC VASCULAR SURGERY)

## 2025-03-20 PROCEDURE — 3288F FALL RISK ASSESSMENT DOCD: CPT | Mod: CPTII,S$GLB,, | Performed by: THORACIC SURGERY (CARDIOTHORACIC VASCULAR SURGERY)

## 2025-03-20 PROCEDURE — 99024 POSTOP FOLLOW-UP VISIT: CPT | Mod: S$GLB,,, | Performed by: THORACIC SURGERY (CARDIOTHORACIC VASCULAR SURGERY)

## 2025-03-20 PROCEDURE — 99999 PR PBB SHADOW E&M-EST. PATIENT-LVL IV: CPT | Mod: PBBFAC,,, | Performed by: THORACIC SURGERY (CARDIOTHORACIC VASCULAR SURGERY)

## 2025-03-20 RX ORDER — SULFAMETHOXAZOLE AND TRIMETHOPRIM 800; 160 MG/1; MG/1
1 TABLET ORAL 2 TIMES DAILY
Qty: 28 TABLET | Refills: 1 | Status: SHIPPED | OUTPATIENT
Start: 2025-03-20

## 2025-03-20 NOTE — PROGRESS NOTES
This patient is status post revision of her lower sternotomy incision.  She had a persistent draining sinus at the lower aspect of her wound.  This was addressed in her recent surgery with removal of residual suture material and foreign body.  She also had debridement of the lower aspect of the sternum itself which was overall quite healthy.    Her suction drain is slowly putting out more serous fluid.  She is draining a proximally 30-40 cc daily.    The wound itself is healing well.    At this point I would recommend ongoing KATRINA drainage and then removal within the next week or so.  She seems to be doing fairly well overall.

## 2025-03-26 ENCOUNTER — TELEPHONE (OUTPATIENT)
Dept: VASCULAR SURGERY | Facility: CLINIC | Age: 75
End: 2025-03-26
Payer: MEDICARE

## 2025-03-26 ENCOUNTER — OFFICE VISIT (OUTPATIENT)
Dept: VASCULAR SURGERY | Facility: CLINIC | Age: 75
End: 2025-03-26
Payer: MEDICARE

## 2025-03-26 VITALS — SYSTOLIC BLOOD PRESSURE: 81 MMHG | DIASTOLIC BLOOD PRESSURE: 48 MMHG | HEART RATE: 89 BPM

## 2025-03-26 DIAGNOSIS — Z98.890 STATUS POST SURGERY: ICD-10-CM

## 2025-03-26 DIAGNOSIS — Z48.03 ENCOUNTER FOR CHANGE OR REMOVAL OF DRAINS: Primary | ICD-10-CM

## 2025-03-26 PROCEDURE — 1101F PT FALLS ASSESS-DOCD LE1/YR: CPT | Mod: CPTII,S$GLB,, | Performed by: THORACIC SURGERY (CARDIOTHORACIC VASCULAR SURGERY)

## 2025-03-26 PROCEDURE — 99024 POSTOP FOLLOW-UP VISIT: CPT | Mod: S$GLB,,, | Performed by: THORACIC SURGERY (CARDIOTHORACIC VASCULAR SURGERY)

## 2025-03-26 PROCEDURE — 3288F FALL RISK ASSESSMENT DOCD: CPT | Mod: CPTII,S$GLB,, | Performed by: THORACIC SURGERY (CARDIOTHORACIC VASCULAR SURGERY)

## 2025-03-26 PROCEDURE — 1159F MED LIST DOCD IN RCRD: CPT | Mod: CPTII,S$GLB,, | Performed by: THORACIC SURGERY (CARDIOTHORACIC VASCULAR SURGERY)

## 2025-03-26 PROCEDURE — 3078F DIAST BP <80 MM HG: CPT | Mod: CPTII,S$GLB,, | Performed by: THORACIC SURGERY (CARDIOTHORACIC VASCULAR SURGERY)

## 2025-03-26 PROCEDURE — 1125F AMNT PAIN NOTED PAIN PRSNT: CPT | Mod: CPTII,S$GLB,, | Performed by: THORACIC SURGERY (CARDIOTHORACIC VASCULAR SURGERY)

## 2025-03-26 PROCEDURE — 99999 PR PBB SHADOW E&M-EST. PATIENT-LVL III: CPT | Mod: PBBFAC,,, | Performed by: THORACIC SURGERY (CARDIOTHORACIC VASCULAR SURGERY)

## 2025-03-26 PROCEDURE — 3074F SYST BP LT 130 MM HG: CPT | Mod: CPTII,S$GLB,, | Performed by: THORACIC SURGERY (CARDIOTHORACIC VASCULAR SURGERY)

## 2025-03-26 PROCEDURE — 1160F RVW MEDS BY RX/DR IN RCRD: CPT | Mod: CPTII,S$GLB,, | Performed by: THORACIC SURGERY (CARDIOTHORACIC VASCULAR SURGERY)

## 2025-03-26 NOTE — TELEPHONE ENCOUNTER
----- Message from Chasidy sent at 3/26/2025 12:11 PM CDT -----  Contact: self  Type:  Needs Medical AdviceWho Called: selfSymptoms (please be specific): pt is running late, 10 min away, tried To call nurse Would the patient rather a call back or a response via [facility]? callBe Call Back Number: [phone] (home) [phone] (work)Additional Information: please advise and thank you.

## 2025-03-26 NOTE — PROGRESS NOTES
This patient is status post foreign body material from previous sternotomy.  She comes back to the office today in follow-up.  Medicines are noted and are part of the epic record.    On exam vital signs are stable.  Her sutures were removed from the lower aspect of the sternotomy incision.  Her drain has been decreasing the amount of drainage and was also removed.  The drainage was serous.  She has slight skin separation with the incision but gross dehiscence.  I do not appreciate any fluid collection or evidence of infection except a slight swelling on upper outer aspect of sternum that has gotten smaller.  Recommendation is for local wound care and an office visit next week.  I think this should gradually granulate in.  She seems to have discomfort which has persisted and has pain medicine artery ordered and she takes as necessary.  The wound will be reassessed next week.     yes

## 2025-03-31 ENCOUNTER — TELEPHONE (OUTPATIENT)
Dept: VASCULAR SURGERY | Facility: CLINIC | Age: 75
End: 2025-03-31
Payer: MEDICARE

## 2025-03-31 NOTE — TELEPHONE ENCOUNTER
----- Message from Pattieia sent at 3/31/2025  3:32 PM CDT -----  Type:  Patient Returning CallWho Called: the patientWho Left Message for Patient:REJI OLIVERFaisal the patient know what this is regarding?:yes/noWould the patient rather a call back or a response via [facility]? call back /[facility] Call Back Number:[phone] Information: Pt states she would like to speak to staff in reference to 4/[employer]  ----- Message -----  From: Ramy Chilel  Sent: 3/31/2025   3:33 PM CDT  To: Sofie HART Staff    Type:  Patient Returning CallWho Called: the patientWho Left Message for Patient:OLIVER, Reece the patient know what this is regarding?:yes/noWould the patient rather a call back or a response via [facility]? call back /[facility] Call Back Number:[phone] Information: Thanks

## 2025-04-01 NOTE — TELEPHONE ENCOUNTER
Spoke with Mrs Calderon who sent a picture of her surgical wound that now is dehisced. Dr Carrizales reviewed the photo and wants to see Mrs Calderon in the [place] clinic tomorrow, instead of the existing appt in [place] on Thursday. Appt scheduled for tomorrow @ 12:30.

## 2025-04-02 ENCOUNTER — TELEPHONE (OUTPATIENT)
Dept: VASCULAR SURGERY | Facility: CLINIC | Age: 75
End: 2025-04-02
Payer: MEDICARE

## 2025-04-02 NOTE — TELEPHONE ENCOUNTER
Noted-Dr Carrizales advised. Spoke with Janice who will go down to the ER and directly admit to [facility].

## 2025-04-02 NOTE — TELEPHONE ENCOUNTER
----- Message from Mia sent at 4/2/2025  9:07 AM CDT -----  Type:  Needs Medical AdviceWho Called: ptSymptoms (please be specific): na How long has patient had these symptoms:  naPharmacy name and phone #:  naWould the patient rather a call back or a response via [facility]? Call [facility] Call Back Number: [phone] Information: pt is calling to let the office know that she is headed to the [facility] as instructed      Please call Back to advise. Thanks!

## 2025-04-03 PROBLEM — T81.31XA WOUND DEHISCENCE, SURGICAL: Status: ACTIVE | Noted: 2024-05-30

## 2025-04-08 ENCOUNTER — TELEPHONE (OUTPATIENT)
Dept: VASCULAR SURGERY | Facility: CLINIC | Age: 75
End: 2025-04-08
Payer: MEDICARE

## 2025-04-08 NOTE — TELEPHONE ENCOUNTER
Per Janice, Mrs Calderon can be seen the week of May 19th, as she is on 6 weeks of daily IV abx, and will have completed by then.

## 2025-04-08 NOTE — TELEPHONE ENCOUNTER
----- Message from North sent at 4/8/2025 12:17 PM CDT -----  Type:  Sooner Apoointment RequestCaller is requesting a sooner appointment.   Name of Caller:ptWhen is the first available appointment?dept. bookedSymptoms:hospital ./ould the patient rather a call back or a response via [facility]? Call [facility] Call Back Number:[phone] Additional Information: pt is being discharged 4/8 & needs a call back to william an appt with doc. Please call to discuss.

## 2025-04-09 PROCEDURE — G0180 MD CERTIFICATION HHA PATIENT: HCPCS | Mod: ,,, | Performed by: THORACIC SURGERY (CARDIOTHORACIC VASCULAR SURGERY)

## 2025-04-10 ENCOUNTER — NURSE TRIAGE (OUTPATIENT)
Dept: ADMINISTRATIVE | Facility: CLINIC | Age: 75
End: 2025-04-10
Payer: MEDICARE

## 2025-04-10 ENCOUNTER — TELEPHONE (OUTPATIENT)
Dept: VASCULAR SURGERY | Facility: CLINIC | Age: 75
End: 2025-04-10
Payer: MEDICARE

## 2025-04-10 NOTE — TELEPHONE ENCOUNTER
Pt is calling and states that she just got out of the hospital day before yesterday. She started having some issues with BMs and was told by the Infectious disease doctor to go back to the hospital tonight and tell the ER he sent her there. Pt cannot remember what the Infectious disease doctor's name was. I looked in pt's Chart review and could not find any conversation b/w pt and an infectious disease Doctor to help pt. Advised pt to continue to the ER and explain to them what happened. Pt VU. Advised to call back with any further concerns.   Reason for Disposition   Caller has NON-URGENT question and triager unable to answer question    Additional Information   Negative: Sounds like a life-threatening emergency to the triager   Negative: Patient sounds very sick or weak to the triager   Negative: Sounds like a serious complication to the triager   Negative: Condition / symptoms WORSE   Negative: Caller has URGENT question and triager unable to answer question   Negative: Patient wants to be seen   Negative: Condition / symptoms SAME (not improving)    Protocols used: Post-Hospitalization Follow-up Call-A-[place]

## 2025-04-10 NOTE — TELEPHONE ENCOUNTER
"Mrs Calderon called the office to advise that she has not had a bowel movement in 5 days(she states she has been home from the hospital for 2 days, and her bowels didn't move the last 3 days in the hospital, as well). She states she has taken two doses of Colace, and one dose of Miralax. I asked if she has been drinking water and she states, "Yes". I also asked if she has been eating and she stated, I've eaten some, but vomited twice yesterday of some "green-green" substance, and once today of some "whitish colored substance". She stated that she went to [facility] earlier today for her IV abx infusion. I asked if she mentioned her GI problems to the nurse and she stated, "No". I asked if home health has been out yet to admit her. She stated someone came out yesterday and admitted her. I asked if she mentioned to the nurse about the constipation and nausea and she couldn't remember. I placed a call to [facility] and spoke with Marlon who stated that nothing was mentioned to the nurse during the visit. He stated he is going to get in touch with someone in ID at [facility] since they are treating with the IV antibiotics, and He is also going to send a nurse out tomorrow to assess Mrs Calderon. I advised that we will follow with them via her chart.I called Mrs Calderon to let her know that someone from  will be coming out tomorrow to assess her. She stated she has to go to [facility] at 3:00 for her infusion. I advised that someone will call [facility] to set a time for the visit, and she can then tell them that she has her infusion appt tomorrow afternoon. Understanding verbalized.  "

## 2025-04-11 ENCOUNTER — TELEPHONE (OUTPATIENT)
Dept: INFECTIOUS DISEASES | Facility: CLINIC | Age: 75
End: 2025-04-11
Payer: MEDICARE

## 2025-04-11 NOTE — TELEPHONE ENCOUNTER
Called pt, she is home now from [facility] ED, went last night for n/v green bile and no bm for 5-7 days. Pt is on Oxycodone, zofran, Diflucan and 2 IV abx, plus.  Pt states she is taking colace, went to ED, had bm, ct, xrays, was told she was constipated but no obstruction.  She is still passing stool and continues the colace.   She does have Zofran, but has been on it for years and states that it does cause her constipation, therefore she takes seldom.  Per Dr. Sparks - advised she can hold the  oral fluconazole until she feels better.  Advised pt to hydrate well.  Pt states she feels better, but fatigued.   Urged to call back today with any further questions/concerns.

## 2025-04-15 ENCOUNTER — LAB REQUISITION (OUTPATIENT)
Dept: LAB | Facility: HOSPITAL | Age: 75
End: 2025-04-15
Payer: MEDICARE

## 2025-04-15 DIAGNOSIS — T81.42XA INFECTION FOLLOWING A PROCEDURE, DEEP INCISIONAL SURGICAL SITE, INITIAL ENCOUNTER: ICD-10-CM

## 2025-04-15 LAB
ABSOLUTE EOSINOPHIL (OHS): 0.42 K/UL
ABSOLUTE MONOCYTE (OHS): 0.65 K/UL (ref 0.3–1)
ABSOLUTE NEUTROPHIL COUNT (OHS): 3.89 K/UL (ref 1.8–7.7)
ALBUMIN SERPL BCP-MCNC: 3.5 G/DL (ref 3.5–5.2)
ALP SERPL-CCNC: 68 UNIT/L (ref 40–150)
ALT SERPL W/O P-5'-P-CCNC: 27 UNIT/L (ref 10–44)
ANION GAP (OHS): 12 MMOL/L (ref 8–16)
AST SERPL-CCNC: 28 UNIT/L (ref 11–45)
BASOPHILS # BLD AUTO: 0.05 K/UL
BASOPHILS NFR BLD AUTO: 0.9 %
BILIRUB SERPL-MCNC: 0.2 MG/DL (ref 0.1–1)
BUN SERPL-MCNC: 13 MG/DL (ref 8–23)
CALCIUM SERPL-MCNC: 9.1 MG/DL (ref 8.7–10.5)
CHLORIDE SERPL-SCNC: 102 MMOL/L (ref 95–110)
CK SERPL-CCNC: 45 U/L (ref 20–180)
CO2 SERPL-SCNC: 22 MMOL/L (ref 23–29)
CREAT SERPL-MCNC: 0.8 MG/DL (ref 0.5–1.4)
CRP SERPL-MCNC: 26.6 MG/L
ERYTHROCYTE [DISTWIDTH] IN BLOOD BY AUTOMATED COUNT: 13.2 % (ref 11.5–14.5)
GFR SERPLBLD CREATININE-BSD FMLA CKD-EPI: >60 ML/MIN/1.73/M2
GLUCOSE SERPL-MCNC: 129 MG/DL (ref 70–110)
HCT VFR BLD AUTO: 31.6 % (ref 37–48.5)
HGB BLD-MCNC: 10.2 GM/DL (ref 12–16)
IMM GRANULOCYTES # BLD AUTO: 0.03 K/UL (ref 0–0.04)
IMM GRANULOCYTES NFR BLD AUTO: 0.5 % (ref 0–0.5)
LYMPHOCYTES # BLD AUTO: 0.63 K/UL (ref 1–4.8)
MCH RBC QN AUTO: 30.9 PG (ref 27–31)
MCHC RBC AUTO-ENTMCNC: 32.3 G/DL (ref 32–36)
MCV RBC AUTO: 96 FL (ref 82–98)
NUCLEATED RBC (/100WBC) (OHS): 0 /100 WBC
PLATELET # BLD AUTO: 241 K/UL (ref 150–450)
PMV BLD AUTO: 11 FL (ref 9.2–12.9)
POTASSIUM SERPL-SCNC: 3.9 MMOL/L (ref 3.5–5.1)
PROT SERPL-MCNC: 6.8 GM/DL (ref 6–8.4)
RBC # BLD AUTO: 3.3 M/UL (ref 4–5.4)
RELATIVE EOSINOPHIL (OHS): 7.4 %
RELATIVE LYMPHOCYTE (OHS): 11.1 % (ref 18–48)
RELATIVE MONOCYTE (OHS): 11.5 % (ref 4–15)
RELATIVE NEUTROPHIL (OHS): 68.6 % (ref 38–73)
SODIUM SERPL-SCNC: 136 MMOL/L (ref 136–145)
WBC # BLD AUTO: 5.67 K/UL (ref 3.9–12.7)

## 2025-04-15 PROCEDURE — 85025 COMPLETE CBC W/AUTO DIFF WBC: CPT | Performed by: INTERNAL MEDICINE

## 2025-04-15 PROCEDURE — 80053 COMPREHEN METABOLIC PANEL: CPT | Performed by: INTERNAL MEDICINE

## 2025-04-15 PROCEDURE — 86140 C-REACTIVE PROTEIN: CPT | Performed by: INTERNAL MEDICINE

## 2025-04-15 PROCEDURE — 82550 ASSAY OF CK (CPK): CPT | Performed by: INTERNAL MEDICINE

## 2025-04-21 ENCOUNTER — TELEPHONE (OUTPATIENT)
Dept: INFECTIOUS DISEASES | Facility: CLINIC | Age: 75
End: 2025-04-21
Payer: MEDICARE

## 2025-04-21 NOTE — TELEPHONE ENCOUNTER
"Call from [facility] nurse Norma, stating could not access pt's port, "feels funny", did not get IV abx today. States pt has has this port for a very long time, had issues last week with port and pt states her vein is "sticking out now where it was not before".  Advised pt is to report to ED asap for evaluation of port.   We will deal with restart of IV abx after that evaluation, but pt is to go THIS AFTERNOON!  Norma will call pt with above advisement    Marie Hernandez PA-C notified, concurs with advisement.  "

## 2025-04-23 ENCOUNTER — TELEPHONE (OUTPATIENT)
Dept: INFECTIOUS DISEASES | Facility: CLINIC | Age: 75
End: 2025-04-23
Payer: MEDICARE

## 2025-04-23 NOTE — TELEPHONE ENCOUNTER
Called pt to check status of port and IV abx, spoke with  , states no issues with port, infusion abx as ordered, discussed f/u on 4/29/25 at 9 am.     Pt states she started with diarrhea x 4 yesterday 4/22/25, took rx lomotil with relief. Pt states she is also taking mirilax prn.

## 2025-04-28 NOTE — PROGRESS NOTES
[facility]  Infectious Disease        Patient ID: Liliana Calderon is a 74 y.o. female.    Chief Complaint: Wound Infection      Diagnoses and all orders for this visit:    Sternal wound infection    Postoperative wound dehiscence, subsequent encounter    Controlled type 2 diabetes mellitus without complication, without long-term current use of insulin    Receiving intravenous antibiotic treatment as outpatient         38 minutes was spent on this encounter, which included: review of recent encounters, review and interpretation of labs/images, obtaining pertinent history, performing a physical examination, counseling and educating the patient/family/caregiver, ordering medications/tests, documenting in the electronic health record, and coordinating care with necessary providers.    Interval HPI:   4/29/25 - ID clinic f/u. Patient here with her . They report she has been doing well overall since discharge home. Wound has been healing and decreasing in size with wound vac. Compliant with antibiotics and tolerating without issues. Denies fevers, chills, or night sweats. Complains of Home Health having trouble accessing her port (and some tenderness today as a result of their attempts), but reports that the nurse at [place] who typically draw her labs for Dr. Sanders has no issues with it. Requesting to change to have labs drawn at [facility].    Assessment and Plan:     # Surgical site infection  - Received prolong Bactrim  - 3/12: Wound culture: non albicans Candida species, CoNS (2 species)  - 4/2 Wound cx: Proteus mirabilis  - MRSA screen: Neg  - 4/3/25 s/p I&D in OR. Findings of seropurulent fluid in the subQ tissue and fascia overlying the sternum, but sternum was intact  - OR Cultures:  Proteus mirabilis (R-FQ, bactrim, and aminoglycosides) and skin vinita  - Considering the length of time patient has had this infection and previous presence of sternal wires, opted to treat as  osteomyelitis  - discharged home on IV Daptomycin + CTX + PO fluconazole x6 weeks from surgery (EOC 5/14/25)     # S/p CABG     # Hx adenocarcinoma of the lung  - currently on immunotherapy per Dr. Sanders     # Diabetes     # Anaphylaxis to penicillins   Tolerated cephalosporins in the past     Recommendations:  - labs reviewed. No leukocytosis, CMP satisfactory, CRP downtrending  - continue Ceftriaxone 2 g IV daily   - continue Daptomycin IV 6mg/kg  - Continue Fluconazole 400 mg p.o. daily   - continue weekly CBC, CMP, CRP, CPK - will change to have drawn at [place] per patient request  - length of therapy: 6 weeks from surgery (EOC 5/14/25)  - she should continue to f/u with CTS and Wound Care as instructed  - she may f/u with ID as needed    Will forward note to CTS MAGALI LOFTON PA-C  Infectious Diseases  [facility]      HPI:      This is a 74-year-old female with previous medical history of CAD SP CABG many years ago, HTN, diabetes,  adenocarcinoma of the lung.  Underwent left upper lobe resection.   It appears that 7 years  ago underwent CABG complicated with surgical site infection and wound dehiscence   For which patient was prescribed Bactrim which she took for years.    Patient was admitted in 2/2024 for surgical incision, sternotomy and wire removal.  No cultures were obtained at the time   And Bactrim was continued.    Patient states that despite taking Bactrim her wound continued to dehisced with persistent purulence   Because of all of the above patient was brought to this hospital for hospitalization on 04/02/2025.       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 Resolved with rib resection    Tobacco abuse 2017       Past Surgical History:   Procedure Laterality Date    APPENDECTOMY      CARDIAC CATHETERIZATION      CATARACT EXTRACTION W/  INTRAOCULAR LENS IMPLANT Left     CATARACT EXTRACTION W/  INTRAOCULAR LENS IMPLANT Right     CHOLECYSTECTOMY      CORONARY ANGIOPLASTY      CORONARY ARTERY BYPASS GRAFT  2018    HERNIA REPAIR      HYSTERECTOMY      INCISION AND DRAINAGE N/A 2024    Procedure: Incision and Drainage sternum;  Surgeon: Eric Carrizales MD;  Location: [facility] OR;  Service: Peripheral Vascular;  Laterality: N/A;    IRRIGATION AND DEBRIDEMENT, WOUND, STERNUM N/A 4/3/2025    Procedure: IRRIGATION AND DEBRIDEMENT, WOUND, STERNUM;  Surgeon: Eric Carrizales MD;  Location: [facility] OR;  Service: Cardiothoracic;  Laterality: N/A;    LUNG LOBECTOMY Left 2024    at OL in [place]    STERNAL WIRES REMOVAL N/A 2024    Procedure: REMOVAL, STERNAL WIRE;  Surgeon: Eric Carrizales MD;  Location: [facility] OR;  Service: Peripheral Vascular;  Laterality: N/A;    STERNAL WIRES REMOVAL N/A 3/12/2025    Procedure: REMOVAL, STERNAL WIRE;  Surgeon: Eric Carrizales MD;  Location: [facility] OR;  Service: Peripheral Vascular;  Laterality: N/A;    THORACIC OUTLET SURGERY Left     TRIAL OF SPINAL CORD NERVE STIMULATOR         Family History   Problem Relation Name Age of Onset    Dementia Mother      Diabetes Father      Heart disease Father         Social History     Socioeconomic History    Marital status:    Tobacco Use    Smoking status: Former     Current packs/day: 0.00     Types: Cigarettes     Quit date: 2018     Years since quittin.9     Passive exposure: Past    Smokeless tobacco: Never  "  Substance and Sexual Activity    Alcohol use: No    Drug use: Never    Sexual activity: Yes     Partners: Male     Social Drivers of Health     Financial Resource Strain: Low Risk  (4/3/2025)    Overall Financial Resource Strain (CARDIA)     Difficulty of Paying Living Expenses: Not hard at all   Food Insecurity: No Food Insecurity (4/3/2025)    Hunger Vital Sign     Worried About Running Out of Food in the Last Year: Never true     Ran Out of Food in the Last Year: Never true   Transportation Needs: No Transportation Needs (4/3/2025)    PRAPARE - Transportation     Lack of Transportation (Medical): No     Lack of Transportation (Non-Medical): No   Physical Activity: Inactive (2/21/2024)    [facility] from [facility]    Exercise Vital Sign     Days of Exercise per Week: 0 days     Minutes of Exercise per Session: 0 min   Stress: No Stress Concern Present (4/3/2025)    [employer] - Occupational Stress Questionnaire     Feeling of Stress : Not at all   Housing Stability: Unknown (4/3/2025)    Housing Stability Vital Sign     Unable to Pay for Housing in the Last Year: Patient unable to answer     Number of Times Moved in the Last Year: 0     Homeless in the Last Year: No       Review of patient's allergies indicates:   Allergen Reactions    Bleach (sodium hypochlorite) Shortness Of Breath     "Allergic to Cleaning Products"    Penicillins Anaphylaxis     Severe Respiratory Distress when taken in the past    Perfume Other (See Comments) and Shortness Of Breath     Eye drainage.    Ranolazine Other (See Comments)     SHAKING AND UNABLE TO WALK. Hospitalized    Celecoxib Nausea And Vomiting    Adhesive Other (See Comments)     Sensitivity - takes skin off    Aspirin      Full strength ASA makes her vomit blood    Benzonatate Other (See Comments)     benzonatate    Clindamycin Diarrhea and Other (See Comments)    " Cortisone Nausea And Vomiting    Fluoxetine      Elevated HR and BP    Iodine and iodide containing products     Prednisone Nausea And Vomiting       Current Outpatient Medications   Medication Instructions    ALPRAZolam (XANAX) 0.25 mg, As needed (PRN)    aspirin (ECOTRIN) 81 mg, Daily    azelastine (ASTELIN) 137 mcg (0.1 %) nasal spray 1 spray, 2 times daily PRN    cholecalciferol (vitamin D3) (VITAMIN D3) 1,000 Units    D5W PgBk 100 mL with cefTRIAXone 2 gram SolR 2 g 2 g, Intravenous, Daily    DAPTOmycin 50 mg/mL in 0.9% NaCl solution 350 mg, Intravenous, Daily    diphenoxylate-atropine 2.5-0.025 mg (LOMOTIL) 2.5-0.025 mg per tablet 1 tablet, 4 times daily PRN    docusate sodium (COLACE) 100 mg, 2 times daily PRN    estradiol 0.05 mg/24 hr td ptwk 0.05 mg/24 hr PTWK 1 patch, Every Friday    fenofibrate 160 mg, Nightly    fluconazole (DIFLUCAN) 400 mg, Oral, Daily    folic acid (FOLVITE) 1 mg, Daily    glimepiride (AMARYL) 2 mg, 2 times daily    HYDROcodone-acetaminophen (NORCO) 5-325 mg per tablet 1 tablet, Oral, Every 6 hours PRN    levothyroxine (SYNTHROID) 100 mcg, Before breakfast    LIDOcaine-prilocaine (EMLA) cream Topical (Top), Once as needed    magnesium oxide (MAG-OX) 400 mg, 3 times daily    metFORMIN (GLUCOPHAGE) 500 mg, 2 times daily with meals    metoprolol succinate (TOPROL-XL) 25 mg, Oral, Daily    nitroGLYCERIN (NITROSTAT) 0.4 mg, Sublingual, Every 5 min PRN    omeprazole (PRILOSEC) 40 mg, Daily    ondansetron (ZOFRAN-ODT) 4 mg, Every 8 hours PRN    pregabalin (LYRICA) 75 mg, Oral, Nightly, Takes with 50 mg     pregabalin (LYRICA) 50 mg, Nightly    ramipriL (ALTACE) 10 mg, Daily    rosuvastatin (CRESTOR) 20 mg, Nightly         Review of Systems   Constitutional:  Negative for chills, diaphoresis, fatigue and fever.   HENT:  Negative for congestion and sore throat.    Respiratory:  Negative for cough and shortness of breath.    Cardiovascular:  Negative for chest pain, palpitations and leg  swelling.   Gastrointestinal:  Negative for abdominal pain, diarrhea, nausea and vomiting.   Genitourinary:  Negative for dysuria, flank pain, hematuria and urgency.   Musculoskeletal:  Negative for joint swelling, myalgias, neck pain and neck stiffness.   Skin:  Positive for wound. Negative for color change and rash.   Allergic/Immunologic: Negative for immunocompromised state.   Neurological:  Negative for dizziness, weakness, numbness and headaches.   Psychiatric/Behavioral:  Negative for confusion.            Objective:     There were no vitals filed for this visit.           Physical Exam  Vitals and nursing note reviewed.   Constitutional:       General: She is awake. She is not in acute distress.     Appearance: Normal appearance. She is well-developed. She is not diaphoretic.   HENT:      Head: Normocephalic and atraumatic.      Right Ear: External ear normal.      Left Ear: External ear normal.      Nose: Nose normal.   Eyes:      General: No scleral icterus.     Conjunctiva/sclera: Conjunctivae normal.      Pupils: Pupils are equal, round, and reactive to light.   Cardiovascular:      Rate and Rhythm: Normal rate and regular rhythm.      Heart sounds: Normal heart sounds. No murmur heard.  Pulmonary:      Effort: Pulmonary effort is normal. No accessory muscle usage or respiratory distress.      Breath sounds: Normal breath sounds.   Chest:      Chest wall: No swelling, tenderness or edema.       Abdominal:      General: Bowel sounds are normal. There is no distension.      Palpations: Abdomen is soft.      Tenderness: There is no abdominal tenderness. There is no guarding.   Musculoskeletal:      Cervical back: Normal range of motion and neck supple.   Skin:     General: Skin is warm and dry.   Neurological:      General: No focal deficit present.      Mental Status: She is alert and oriented to person, place, and time.   Psychiatric:         Attention and Perception: Attention normal.         Mood and  Affect: Mood normal.         Speech: Speech normal.         Behavior: Behavior normal.           CrCl cannot be calculated (Unknown ideal weight.).      Microbiology Results (last 7 days)       ** No results found for the last 168 hours. **              Significant Labs: All pertinent labs within the past 24 hours have been reviewed.     Significant Imaging: I have reviewed all relevant and available imaging results/findings within the past 24 hours.      Plan -- see top of note

## 2025-04-29 ENCOUNTER — TELEPHONE (OUTPATIENT)
Dept: INFECTIOUS DISEASES | Facility: CLINIC | Age: 75
End: 2025-04-29

## 2025-04-29 ENCOUNTER — OFFICE VISIT (OUTPATIENT)
Dept: INFECTIOUS DISEASES | Facility: CLINIC | Age: 75
End: 2025-04-29
Payer: MEDICARE

## 2025-04-29 VITALS
HEART RATE: 89 BPM | HEIGHT: 61 IN | DIASTOLIC BLOOD PRESSURE: 66 MMHG | RESPIRATION RATE: 20 BRPM | TEMPERATURE: 99 F | WEIGHT: 120.81 LBS | SYSTOLIC BLOOD PRESSURE: 128 MMHG | BODY MASS INDEX: 22.81 KG/M2

## 2025-04-29 DIAGNOSIS — S21.101A STERNAL WOUND INFECTION: Primary | ICD-10-CM

## 2025-04-29 DIAGNOSIS — E11.9 CONTROLLED TYPE 2 DIABETES MELLITUS WITHOUT COMPLICATION, WITHOUT LONG-TERM CURRENT USE OF INSULIN: ICD-10-CM

## 2025-04-29 DIAGNOSIS — L08.9 STERNAL WOUND INFECTION: Primary | ICD-10-CM

## 2025-04-29 DIAGNOSIS — T81.31XD POSTOPERATIVE WOUND DEHISCENCE, SUBSEQUENT ENCOUNTER: ICD-10-CM

## 2025-04-29 DIAGNOSIS — Z79.2 RECEIVING INTRAVENOUS ANTIBIOTIC TREATMENT AS OUTPATIENT: ICD-10-CM

## 2025-04-29 PROCEDURE — 99999 PR PBB SHADOW E&M-EST. PATIENT-LVL IV: CPT | Mod: PBBFAC,,, | Performed by: PHYSICIAN ASSISTANT

## 2025-04-29 NOTE — TELEPHONE ENCOUNTER
----- Message from Mónica sent at 4/29/2025  3:12 PM CDT -----  Contact: Patient  Type:  Needs Medical AdviceWho Called:  PatientWould the patient rather a call back or a response via [facility]?  Call [facility] Call Back Number:   [phone] Information:  States she would like to speak with Marie, please - states she just got a call from [facility] about the appointment to do port and take labs - states they told her the appointment is scheduled for 5/13 as requested - states she understood everything should be finished by 5/14 - states appointments were supposed to start on Thursday of this week (5/1) - states she will have gone 2 weeks without port being cleaned or labs done - please call - thank you

## 2025-04-29 NOTE — TELEPHONE ENCOUNTER
Returned call to patient who states [facility] called her that her next blood draw and port flush and dressing change is scheduled for 5/13/25. Pt needs labs THIS week and next week.    I will call [facility] tomorrow morning and call pt    [phone] pt believes to [facility] scheduling department for outpatient lab

## 2025-04-30 ENCOUNTER — TELEPHONE (OUTPATIENT)
Dept: INFECTIOUS DISEASES | Facility: CLINIC | Age: 75
End: 2025-04-30
Payer: MEDICARE

## 2025-04-30 DIAGNOSIS — L08.9 STERNAL WOUND INFECTION: Primary | ICD-10-CM

## 2025-04-30 DIAGNOSIS — S21.101A STERNAL WOUND INFECTION: Primary | ICD-10-CM

## 2025-04-30 NOTE — TELEPHONE ENCOUNTER
"Received call from Nuris at [facility] who asked that "may use port" added to orders and faxed back to her at [phone], she will call and schedule pt with nurse for labs draw from port, task completed as requested.  " Calm/Appropriate

## 2025-04-30 NOTE — TELEPHONE ENCOUNTER
Pt seen in clinic 4/29/25 by Marie Hernandez PA-C.  Pt has port.  IV Dapto +CTX + PO Fluc  No change in therapy.  Lab orders given to pt to take to [facility] per pt request, to be drawn via port 4/30/25 and 5/6/25.  EOC 5/14/25 as pt desires not to have HH draw labs from port, see tele notes.   ID Follow up is PRN

## 2025-04-30 NOTE — TELEPHONE ENCOUNTER
Returned call to patient, I will reach out to [facility] [phone], spoke with Xochitl, who states the orders received by [facility] had an expected date of 5/19/25.  I reprinted orders for this week's lab and next weeks labs, ensuring the expected dates are today 4/30/25 and 5/6/25 and faxed back to [facility] scheduling at [phone], electronic receipt received via my fax    Called pt, informed that 2 sets of lab order, one for this week and one for next week, have been faxed to [facility]

## 2025-04-30 NOTE — TELEPHONE ENCOUNTER
----- Message from Mahnaz sent at 4/29/2025  5:00 PM CDT -----  Type: General Call Back Name of Caller:Pt Would the patient rather a call back or a response via [facility]? [facility] Call Back Number:[phone] Additional Information: Pt is requesting a call back from [facility] please contact pt with further assistance

## 2025-05-08 ENCOUNTER — TELEPHONE (OUTPATIENT)
Dept: VASCULAR SURGERY | Facility: CLINIC | Age: 75
End: 2025-05-08
Payer: MEDICARE

## 2025-05-08 ENCOUNTER — TELEPHONE (OUTPATIENT)
Dept: INFECTIOUS DISEASES | Facility: CLINIC | Age: 75
End: 2025-05-08
Payer: MEDICARE

## 2025-05-08 DIAGNOSIS — T81.31XA POSTOPERATIVE WOUND DEHISCENCE, INITIAL ENCOUNTER: ICD-10-CM

## 2025-05-08 RX ORDER — HYDROCODONE BITARTRATE AND ACETAMINOPHEN 5; 325 MG/1; MG/1
1 TABLET ORAL EVERY 6 HOURS PRN
Qty: 28 TABLET | Refills: 0 | Status: SHIPPED | OUTPATIENT
Start: 2025-05-08

## 2025-05-08 NOTE — TELEPHONE ENCOUNTER
----- Message from Suellen sent at 5/7/2025  4:59 PM CDT -----  Type:  Patient Returning CallWho Called:pt Who Left Message for Patient:donovan Does the patient know what this is regarding?:Would the patient rather a call back or a response via [facility]? Call [facility] Call Back Number:[phone] Additional Information:   Please call back to advise. Thank you.

## 2025-05-08 NOTE — TELEPHONE ENCOUNTER
Spoke with Mrs Calderon who called to state the pharmacy has texted Janice about the prescription she sent in this morning, as they have a question about the rx and she just wanted to let us know.

## 2025-05-08 NOTE — TELEPHONE ENCOUNTER
----- Message from Josseline sent at 5/8/2025  8:39 AM CDT -----  Contact: pt [phone]  Type:  Needs Medical AdviceWho Called: PTSymptoms (please be specific): pain where wound vac  How long has patient had these symptoms:  almost 2 weeksPharmacy name and phone #:  [id number] [place], [place] - [address] [address]Mpgyk: [phone] Fax: [phone] Would the patient rather a call back or a response via [facility]? Call [facility] Call Back Number: [phone] Additional Information: pt is saying her port is giving a problem that she is in pain where wound vac is located

## 2025-05-08 NOTE — TELEPHONE ENCOUNTER
Returned call to patient who states she has been in a lot of pain with wound vac. Pt states HH does not visit now. She also states she does not have enough pain medication, sees Pain management and Dr. Carrizales. Advised pt to contact Pain Management and Dr. Carrizales for advisement of c/o.

## 2025-05-08 NOTE — TELEPHONE ENCOUNTER
Spoke with Mrs Ford who stated she is having pain where the wound vac is located. I [facility] this morning[facility]. He stated that the patients skin is very tender, but there are no breaks in her skin. He also stated that he is placing the woundvac superior to the wound. Mrs Ford also c/o having to take more pain medication due to the increased pain. I suggested that she try taking Tylenol for breakthrough pain, which she stated that she is already taking the Tylenol. I spoke with KIRSTY Car and advised, who stated she will send in one more refill of the [place] to Mrs Calderon's pharmacy. Mrs Calderon then asked how much longer she has to be on the antibiotics and also have the woundvac. I advised that the IV antibiotics are prescribed until 5/14/25, and that she is scheduled to see Dr Carrizales on 5/15/25 at 9:45 to assess the wound. She stated she has appts at [facility] on 5/15 @ 9:30 for labwork, 10:40 for office visit with Dr Rj Crooks, Oncologist and then 11:30 IV Infusion of her Keytruda, which she stated she started back on 4/24/25, after seeing Dr Crooks in office visit. Advised that I am going to speak with Dr Carrizales to advise that the Keytruda has been started back, and I will give her a call back to see what POC is going forward.

## 2025-05-08 NOTE — TELEPHONE ENCOUNTER
----- Message from Giovana sent at 5/8/2025  3:48 PM CDT -----  Type: Needs Medical AdviceWho Called:  pt [facility] Call Back Number: [phone] (home) [phone] (work)Additional Information: pt requesting call back from tyler in regards to her script that was sent today please advise

## 2025-05-12 ENCOUNTER — TELEPHONE (OUTPATIENT)
Dept: VASCULAR SURGERY | Facility: CLINIC | Age: 75
End: 2025-05-12
Payer: MEDICARE

## 2025-05-12 NOTE — TELEPHONE ENCOUNTER
----- Message from Starla sent at 5/12/2025 10:21 AM CDT -----  Contact: pt  Type:  Needs Medical AdviceWho Called: Angle name and phone #:   [id number] [place] - [address] [address]Tdyeh: [phone] Fax: [phone] the patient rather a call back or a response via [facility]?  Call [facility] Call Back Number:  [phone] Information:  sts that pain medication still not at pharmacy, still waiting on additional information from office before they can fill itPlease call to [facility]

## 2025-05-12 NOTE — TELEPHONE ENCOUNTER
Spoke with the pharmacist at [facility] and advised that Mrs Calderon recently had a surgical procedure and was given the Norco to take instead of her Percocet 10mg that is given by pain management. Mrs Calderon stated she is having to take the Percocet, as she wasn't able to get the Norco rx filled. After being told of the recent surgical procedure, the pharmacist stated she would fill the Norco and I spoke with Mrs Calderon and advised that she is only to take the Norco, and she can try taking Tylenol for breakthrough pain, which stated stated she has been doing this and it has been helping. Will continue to monitor.

## 2025-05-14 ENCOUNTER — TELEPHONE (OUTPATIENT)
Dept: INFECTIOUS DISEASES | Facility: CLINIC | Age: 75
End: 2025-05-14
Payer: MEDICARE

## 2025-05-14 NOTE — TELEPHONE ENCOUNTER
Per HECTOR Nava   Pt saw Dr. Carrizales today, wound remains open.  New cultures to be obtained today by Dr. Carrizales  Will continue current IV abx therapy, extend x 1 week to 5/22/25.  Called to Khurram at [facility].  Last labs at [facility] on 5/6/25 in [facility] Everywhere, await culture results.

## 2025-05-20 ENCOUNTER — TELEPHONE (OUTPATIENT)
Dept: VASCULAR SURGERY | Facility: CLINIC | Age: 75
End: 2025-05-20
Payer: MEDICARE

## 2025-05-20 NOTE — TELEPHONE ENCOUNTER
----- Message from Francisco sent at 5/20/2025 12:14 PM CDT -----  Contact: self [phone]  Would like to receive medical advice.Would they like a call back or a response via [facility]:  call back from tyler Additional information:  Calling to speak with the office the caller states that she has a procedure done had has some concerns she wants to talk to the office about.

## 2025-05-20 NOTE — TELEPHONE ENCOUNTER
Wound cultures obtained by [facility] on 5/15/25 with rare GPCs in pairs, and only growth of normal skin vinita. No resistant organisms identified.    She has been receiving prolonged course of IV Dapto/CTX/fluconazole which would cover skin vinita.     Per last wound care note, wound is continuing to decrease in size.    Spoke to CTS nurse Jonathan and no plans for surgery right now. Plans to continue with wound care and attempt to close wound without surgical intervention.    Patient apparently still on Keytruda which may hinder healing potential.      From ID standpoint, she has received >6 weeks of broad-spectrum antibiotics for sternal osteomyelitis. Will d/c at this time. Continue to f/u with Wound Care and CTS as instructed.

## 2025-05-20 NOTE — TELEPHONE ENCOUNTER
Spoke with Ms Calderon and advised that we are stopping the IV abx, and will await completion of woundvac therapy to see if wound closes without having to have surgical intervention. Understanding verbalized. Will continue to monitor.

## 2025-05-21 ENCOUNTER — TELEPHONE (OUTPATIENT)
Dept: VASCULAR SURGERY | Facility: CLINIC | Age: 75
End: 2025-05-21
Payer: MEDICARE

## 2025-05-21 NOTE — TELEPHONE ENCOUNTER
Called Hesham at [facility], notified per Marie Hernandez PA-C - D/C IV abx, labs, etc. effective today.  Pt has port, no HH

## 2025-05-21 NOTE — TELEPHONE ENCOUNTER
Spoke with Mrs Calderon and advised that the pharmacy cannot fill pain prescriptions from another doctor, as she is under a pain contract with Dr Joyner. She stated she will give their office a call and see what they can do for her.

## 2025-05-21 NOTE — TELEPHONE ENCOUNTER
----- Message from Haleigh sent at 5/21/2025 11:47 AM CDT -----  Type: Patient callWho called: Patient Does the patient know what this is regarding? Requesting a call back in regards to needing an oxycodone 10-3.25 prescription instead of HYDROcodone-acetaminophen (NORCO) 5-325 mg per table because it upsets her stomach and does not help with pain ; please advise Would the patient rather a call back or response [facility]? [facility]: [phone] Additional information:

## 2025-05-22 ENCOUNTER — TELEPHONE (OUTPATIENT)
Dept: VASCULAR SURGERY | Facility: CLINIC | Age: 75
End: 2025-05-22
Payer: MEDICARE

## 2025-05-22 NOTE — TELEPHONE ENCOUNTER
Mrs Calderon inadvertently called Dr Carrizales's office instead of the woundcare department at [facility]. I placed a call to KORINA Posada and advised that she was running late for her scheduled appt this afternoon.

## 2025-05-22 NOTE — TELEPHONE ENCOUNTER
----- Message from Rebecca sent at 5/22/2025  2:01 PM CDT -----  Contact: PT  Type:  Needs Medical AdviceWho Called: PTSymptoms (please be specific): QUESTION  RE LAB ORDERS  How long has patient had these symptoms:  N/APharmacy name and phone #:  N/AWould the patient rather a call back or a response via [facility]? CALL [facility] Call Back Number: [phone] Information: THANK YOU

## 2025-05-22 NOTE — TELEPHONE ENCOUNTER
----- Message from Rebecca sent at 5/22/2025  2:01 PM CDT -----  Contact: PT  Type:  Needs Medical AdviceWho Called: PTSymptoms (please be specific): QUESTION  RE LAB ORDERS  How long has patient had these symptoms:  N/APharmacy name and phone #:  N/AWould the patient rather a call back or a response via [facility]? CALL [facility] Call Back Number: [phone] Information: THANK YOU

## 2025-06-23 ENCOUNTER — EXTERNAL HOME HEALTH (OUTPATIENT)
Dept: HOME HEALTH SERVICES | Facility: HOSPITAL | Age: 75
End: 2025-06-23
Payer: MEDICARE

## 2025-07-07 ENCOUNTER — DOCUMENT SCAN (OUTPATIENT)
Dept: HOME HEALTH SERVICES | Facility: HOSPITAL | Age: 75
End: 2025-07-07
Payer: MEDICARE